# Patient Record
Sex: FEMALE | URBAN - METROPOLITAN AREA
[De-identification: names, ages, dates, MRNs, and addresses within clinical notes are randomized per-mention and may not be internally consistent; named-entity substitution may affect disease eponyms.]

---

## 2020-09-25 ENCOUNTER — EMERGENCY (EMERGENCY)
Facility: HOSPITAL | Age: 25
LOS: 0 days | Discharge: HOME | End: 2020-09-25
Attending: EMERGENCY MEDICINE
Payer: COMMERCIAL

## 2020-09-25 VITALS — HEIGHT: 62 IN | WEIGHT: 175.05 LBS

## 2020-09-25 VITALS — DIASTOLIC BLOOD PRESSURE: 79 MMHG | HEART RATE: 89 BPM | SYSTOLIC BLOOD PRESSURE: 122 MMHG

## 2020-09-25 DIAGNOSIS — Z3A.17 17 WEEKS GESTATION OF PREGNANCY: ICD-10-CM

## 2020-09-25 DIAGNOSIS — O20.9 HEMORRHAGE IN EARLY PREGNANCY, UNSPECIFIED: ICD-10-CM

## 2020-09-25 DIAGNOSIS — O99.89 OTHER SPECIFIED DISEASES AND CONDITIONS COMPLICATING PREGNANCY, CHILDBIRTH AND THE PUERPERIUM: ICD-10-CM

## 2020-09-25 DIAGNOSIS — R30.0 DYSURIA: ICD-10-CM

## 2020-09-25 DIAGNOSIS — O44.32 PARTIAL PLACENTA PREVIA WITH HEMORRHAGE, SECOND TRIMESTER: ICD-10-CM

## 2020-09-25 LAB
ABO RH CONFIRMATION: SIGNIFICANT CHANGE UP
ALBUMIN SERPL ELPH-MCNC: 3.4 G/DL — SIGNIFICANT CHANGE UP (ref 3.3–5)
ALP SERPL-CCNC: 67 U/L — SIGNIFICANT CHANGE UP (ref 40–120)
ALT FLD-CCNC: 41 U/L — SIGNIFICANT CHANGE UP (ref 12–78)
ANION GAP SERPL CALC-SCNC: 12 MMOL/L — SIGNIFICANT CHANGE UP (ref 5–17)
APPEARANCE UR: ABNORMAL
APTT BLD: 29.9 SEC — SIGNIFICANT CHANGE UP (ref 27.5–35.5)
AST SERPL-CCNC: 18 U/L — SIGNIFICANT CHANGE UP (ref 15–37)
BACTERIA # UR AUTO: ABNORMAL
BASOPHILS # BLD AUTO: 0.06 K/UL — SIGNIFICANT CHANGE UP (ref 0–0.2)
BASOPHILS NFR BLD AUTO: 0.5 % — SIGNIFICANT CHANGE UP (ref 0–2)
BILIRUB SERPL-MCNC: 0.3 MG/DL — SIGNIFICANT CHANGE UP (ref 0.2–1.2)
BILIRUB UR-MCNC: SIGNIFICANT CHANGE UP
BLD GP AB SCN SERPL QL: SIGNIFICANT CHANGE UP
BUN SERPL-MCNC: 4 MG/DL — LOW (ref 7–23)
CALCIUM SERPL-MCNC: 9.4 MG/DL — SIGNIFICANT CHANGE UP (ref 8.5–10.1)
CHLORIDE SERPL-SCNC: 105 MMOL/L — SIGNIFICANT CHANGE UP (ref 96–108)
CO2 SERPL-SCNC: 20 MMOL/L — LOW (ref 22–31)
COLOR SPEC: ABNORMAL
CREAT SERPL-MCNC: 0.72 MG/DL — SIGNIFICANT CHANGE UP (ref 0.5–1.3)
DIFF PNL FLD: SIGNIFICANT CHANGE UP
EOSINOPHIL # BLD AUTO: 0.1 K/UL — SIGNIFICANT CHANGE UP (ref 0–0.5)
EOSINOPHIL NFR BLD AUTO: 0.9 % — SIGNIFICANT CHANGE UP (ref 0–6)
EPI CELLS # UR: ABNORMAL
GLUCOSE SERPL-MCNC: 87 MG/DL — SIGNIFICANT CHANGE UP (ref 70–99)
GLUCOSE UR QL: SIGNIFICANT CHANGE UP
HCT VFR BLD CALC: 37.1 % — SIGNIFICANT CHANGE UP (ref 34.5–45)
HGB BLD-MCNC: 12.7 G/DL — SIGNIFICANT CHANGE UP (ref 11.5–15.5)
IMM GRANULOCYTES NFR BLD AUTO: 1.3 % — SIGNIFICANT CHANGE UP (ref 0–1.5)
INR BLD: 1.07 RATIO — SIGNIFICANT CHANGE UP (ref 0.88–1.16)
KETONES UR-MCNC: SIGNIFICANT CHANGE UP
LEUKOCYTE ESTERASE UR-ACNC: SIGNIFICANT CHANGE UP
LYMPHOCYTES # BLD AUTO: 17.2 % — SIGNIFICANT CHANGE UP (ref 13–44)
LYMPHOCYTES # BLD AUTO: 2.02 K/UL — SIGNIFICANT CHANGE UP (ref 1–3.3)
MCHC RBC-ENTMCNC: 29.8 PG — SIGNIFICANT CHANGE UP (ref 27–34)
MCHC RBC-ENTMCNC: 34.2 GM/DL — SIGNIFICANT CHANGE UP (ref 32–36)
MCV RBC AUTO: 87.1 FL — SIGNIFICANT CHANGE UP (ref 80–100)
MONOCYTES # BLD AUTO: 0.57 K/UL — SIGNIFICANT CHANGE UP (ref 0–0.9)
MONOCYTES NFR BLD AUTO: 4.9 % — SIGNIFICANT CHANGE UP (ref 2–14)
NEUTROPHILS # BLD AUTO: 8.83 K/UL — HIGH (ref 1.8–7.4)
NEUTROPHILS NFR BLD AUTO: 75.2 % — SIGNIFICANT CHANGE UP (ref 43–77)
NITRITE UR-MCNC: SIGNIFICANT CHANGE UP
PH UR: SIGNIFICANT CHANGE UP (ref 5–8)
PLATELET # BLD AUTO: 201 K/UL — SIGNIFICANT CHANGE UP (ref 150–400)
POTASSIUM SERPL-MCNC: 3.3 MMOL/L — LOW (ref 3.5–5.3)
POTASSIUM SERPL-SCNC: 3.3 MMOL/L — LOW (ref 3.5–5.3)
PROT SERPL-MCNC: 7.9 GM/DL — SIGNIFICANT CHANGE UP (ref 6–8.3)
PROT UR-MCNC: SIGNIFICANT CHANGE UP
PROTHROM AB SERPL-ACNC: 12.4 SEC — SIGNIFICANT CHANGE UP (ref 10.6–13.6)
RBC # BLD: 4.26 M/UL — SIGNIFICANT CHANGE UP (ref 3.8–5.2)
RBC # FLD: 13.5 % — SIGNIFICANT CHANGE UP (ref 10.3–14.5)
RBC CASTS # UR COMP ASSIST: >50 /HPF (ref 0–4)
SODIUM SERPL-SCNC: 137 MMOL/L — SIGNIFICANT CHANGE UP (ref 135–145)
SP GR SPEC: SIGNIFICANT CHANGE UP (ref 1.01–1.02)
UROBILINOGEN FLD QL: SIGNIFICANT CHANGE UP
WBC # BLD: 11.73 K/UL — HIGH (ref 3.8–10.5)
WBC # FLD AUTO: 11.73 K/UL — HIGH (ref 3.8–10.5)
WBC UR QL: ABNORMAL

## 2020-09-25 PROCEDURE — 85025 COMPLETE CBC W/AUTO DIFF WBC: CPT

## 2020-09-25 PROCEDURE — 76805 OB US >/= 14 WKS SNGL FETUS: CPT | Mod: 26

## 2020-09-25 PROCEDURE — 85610 PROTHROMBIN TIME: CPT

## 2020-09-25 PROCEDURE — 76817 TRANSVAGINAL US OBSTETRIC: CPT

## 2020-09-25 PROCEDURE — 86900 BLOOD TYPING SEROLOGIC ABO: CPT

## 2020-09-25 PROCEDURE — 99284 EMERGENCY DEPT VISIT MOD MDM: CPT | Mod: 25

## 2020-09-25 PROCEDURE — 87086 URINE CULTURE/COLONY COUNT: CPT

## 2020-09-25 PROCEDURE — 36415 COLL VENOUS BLD VENIPUNCTURE: CPT

## 2020-09-25 PROCEDURE — 85730 THROMBOPLASTIN TIME PARTIAL: CPT

## 2020-09-25 PROCEDURE — 86901 BLOOD TYPING SEROLOGIC RH(D): CPT

## 2020-09-25 PROCEDURE — 99283 EMERGENCY DEPT VISIT LOW MDM: CPT

## 2020-09-25 PROCEDURE — 81001 URINALYSIS AUTO W/SCOPE: CPT

## 2020-09-25 PROCEDURE — 76817 TRANSVAGINAL US OBSTETRIC: CPT | Mod: 26

## 2020-09-25 PROCEDURE — 99285 EMERGENCY DEPT VISIT HI MDM: CPT

## 2020-09-25 PROCEDURE — 86850 RBC ANTIBODY SCREEN: CPT

## 2020-09-25 PROCEDURE — 80053 COMPREHEN METABOLIC PANEL: CPT

## 2020-09-25 PROCEDURE — 76805 OB US >/= 14 WKS SNGL FETUS: CPT

## 2020-09-25 RX ORDER — CEPHALEXIN 500 MG
1 CAPSULE ORAL
Qty: 14 | Refills: 0
Start: 2020-09-25 | End: 2020-10-01

## 2020-09-25 NOTE — ED PROVIDER NOTE - CLINICAL SUMMARY MEDICAL DECISION MAKING FREE TEXT BOX
Pt with FHR of 141 on bedside sono with approx 17 week pregnancy verified with good fetal movement.  Discussed with OB/gyn who recommends TVUS.  TVUS shows placenta previa, which is likely source of bleeding.  No symptoms of anemia.  H/H stable.  No pain.  Denies dysuria.  Leuks on ua likely contaminant and OB recommends against treatment at this time.  As per OB, patient stable for outpatient management.  I discussed strict return precautions with patient and patient understands diagnosis and indications to return.  D/c home with strict return precautions and prompt outpatient f/u.

## 2020-09-25 NOTE — ED ADULT NURSE REASSESSMENT NOTE - NS ED NURSE REASSESS COMMENT FT1
received pt alert and oriented x 4, no acute respiratory distress, respirations even and unlabored, spouse at bedside, ambulating with steady gait, voiding without difficulty, c/o vaginal bleeding, patient transported [place] [facility] [place]

## 2020-09-25 NOTE — ED PROVIDER NOTE - NSFOLLOWUPINSTRUCTIONS_ED_ALL_ED_FT
Placenta Previa    WHAT YOU NEED TO KNOW:    What is placenta previa? Placenta previa is a condition in which your placenta grows near or over your cervix (opening of your uterus). The placenta forms during pregnancy and provides oxygen and nutrition to your unborn baby. The placenta also removes waste products from the fetus. Normally, your placenta grows in the upper part of your uterus. When your placenta grows near your cervix, it may block the opening to your vagina. You may have vaginal bleeding that could harm you and your unborn baby.     What increases my risk for placenta previa? The exact cause of placenta previa is not clear. Your risk increases as you get older. Your risk also increases if you are of  descent. The following may also increase your risk:  •Previous placenta previa: Your risk for placenta previa increases if you have had it during previous pregnancies.      •Infertility treatments: Treatments used to increase your ability to get pregnant may make you more likely to have placenta previa.      •Pregnancy: The more times you have been pregnant, the higher your risk for this condition. Your risk increases if your pregnancies are less than 1 year apart. Being pregnant with more than 1 baby, such as twins, also increases your risk. Past pregnancies that resulted in an  or miscarriage may also increase your risk for placenta previa.      •Past : A  can cause changes in your uterine tissue that increase your risk for placenta previa.      •Smoking and street drug use: Smoking before and during pregnancy increases your risk for placenta previa. Use of street drugs such as cocaine also may lead to placenta previa.      •Uterine scarring: Placenta previa is more likely to occur if you have scarring in your uterus. Scarring may occur from a past surgery or pregnancy. Scarring may increase the risk that your placenta will grow in the lower part of your uterus.      What are the signs and symptoms of placenta previa?   •Vaginal bleeding: Bleeding usually occurs in the late second or early third trimester of pregnancy, but can occur at any time. You may have small or large amounts of bleeding that normally does not cause pain. You may have bleeding after sex. Bleeding may also occur suddenly.      •Contractions: Contractions may cause abdominal pain or cramping.      How is placenta previa diagnosed? Placenta previa is often found during a normal pregnancy visit with your healthcare provider. The later in your pregnancy it is found, the higher the risk that it will not go away. Your healthcare provider will ask about your health and any previous pregnancies. Tell him if you have had past uterine surgeries or procedures. Your healthcare provider may check your cervix by gently putting a speculum into your vagina. A speculum is a tool that opens your vagina to help your healthcare provider see your cervix. You may also need any of the following:  •Ultrasound: ?Transvaginal ultrasound: A small tube is placed inside your vagina so your healthcare provider can see your uterus. It may show if your placenta lies in the lower part of your uterus. It may also show how close your placenta is to the edge or top of your cervix.      ?Abdominal ultrasound: A small device will be moved around your abdomen to show pictures of your uterus. Your healthcare provider may want you to have a full bladder for this test. It may show if your placenta is blocking the opening of your uterus. It may also show problems with your baby, such as slow growth.      ?Doppler ultrasound: A Doppler ultrasound may be done to check if your placenta has grown into the wall of your uterus.      •MRI: This scan uses powerful magnets and a computer to take pictures of your pelvis. MRI pictures may show where your placenta is in your uterus. An MRI may also show if and how deep your placenta has grown into your uterine wall. You may be given dye to help the placenta show up better in the pictures. Tell the healthcare provider if you have ever had an allergic reaction to contrast dye. Do not enter the MRI room with anything made of metal. Metal can cause serious injury. Tell the healthcare provider if you have any metal in or on your body.       How is placenta previa treated? Placenta previa may go away later in your pregnancy, and you may not need treatment. Your placenta may move when your uterus changes shape as you get closer to delivery. If you need treatment, it may depend on how far along you are in your pregnancy. Your treatment also depends on how much of your cervix is covered by your placenta. You may need any of the following:  •Bed rest: You may need to be on bedrest until your baby is ready to be born. You may be able to rest at home, or you may need to stay in the hospital. If you are in the hospital, your healthcare provider may keep you on a monitor. A monitor is used to watch your vital signs (heart rate, breathing, and blood pressure). You may also need to wear a fetal monitor. Ask your healthcare provider which activities you may do while you are on bedrest.      •Blood transfusion: You may need a blood transfusion if you lose a large amount of blood. During a blood transfusion, you will get donated blood through an IV. You may need a transfusion while you are still pregnant or after your baby is born.      •Medicines: ?Steroids: Steroids may be given if you need to deliver your baby earlier than expected. These medicines help your baby's lungs to mature and prevent breathing problems after he is born.      ?Rh immune globulin shot: You may be given an Rh immune globulin injection (RhIG) before your baby is born. RhIG is needed if you and your baby have different Rh blood types (incompatibility). Rh incompatibility means that your baby has a protein in his blood that you do not. Your body may make antibodies against your baby's blood and destroy his blood cells. RhIG injections prevent this from happening.       ? Tocolytics: Tocolytics are given to stop contractions if your baby is not ready to be born. Contractions are when the muscles of your uterus tighten and loosen.       •Amniocentesis: An amniocentesis test may be done between weeks 36 and 37 of your pregnancy if you are not bleeding. This test is done to check your unborn baby's lungs if you have a  date planned. Ask your healthcare provider for more information about amniocentesis.      •Delivery of your baby: Early delivery of your baby may be needed. If your due date is close and your placenta does not cover your cervix, you may be able to give birth vaginally. If your placenta covers most or all of your cervix, a  will be done. A  may also be done if you have heavy bleeding or you or your baby is in danger.      What are the risks of placenta previa?   •Medicines used to treat placenta previa may cause you to have a fast heartbeat. You may be at risk for blood clots. Your placenta may pull away from your uterus when you have contractions. You may have sudden, large amounts of bleeding. You may need a blood transfusion to replace the blood you have lost. Large amounts of blood loss may be life-threatening. Placenta previa increases your risk of placenta accreta. Placenta accreta is when your placenta attaches deep into the wall of your uterus. The deep attachment makes it hard for your placenta to come out after your baby is born. Placenta accreta increases your risk for bleeding even further.      •Your baby may not grow as he should inside your uterus. He may be forced into an abnormal birthing position. Your unborn baby may also have problems with his heartbeat. Your baby may be born too early and his lungs may not be fully developed. He may also be very small. This could be life-threatening for your baby. If you need surgery to deliver your baby, you may get an infection or bleed more than expected. After you give birth, you may need surgery to remove your uterus if you have bleeding that cannot be stopped. You may get a blood clot in your leg or arm. The clot may travel to your heart or brain and cause life-threatening problems, such as a heart attack or stroke.       How can I manage placenta previa?   •Have a safety plan and someone to take you to the hospital if needed.      •Stay within a short distance to the hospital so you can get there quickly.      •Do not douche or have sex. These may cause bleeding.      When should I contact my healthcare provider?   •You feel abdominal cramps, pressure, or tightening.      •Your heart is beating faster than normal for you.      •You have questions or concerns about your condition or care.      When should I seek immediate care or call 911?   •You have any amount of bleeding from your vagina.      •You are having severe abdominal pain or contractions.      •You feel faint or too weak to stand up.      •You suddenly feel lightheaded and short of breath.      •You have chest pain when you take a deep breath or cough.       •You cough up blood.      CARE AGREEMENT:    You have the right to help plan your care. Learn about your health condition and how it may be treated. Discuss treatment options with your healthcare providers to decide what care you want to receive. You always have the right to refuse treatment.

## 2020-09-25 NOTE — ED PROVIDER NOTE - OBJECTIVE STATEMENT
25 y  presenting with vaginal bleeding starting this morning.  Denies lightheadedness, sob/cp.  Denies f/c/n/v/d/dysuria.  No vaginal discharge.  Is 17 weeks pregnant today per dating US yesterday.  Discussed with on call ob who recommended she come to ED for eval.

## 2020-09-25 NOTE — ED PROVIDER NOTE - CARE PLAN
Principal Discharge DX:	Placenta previa in second trimester  Secondary Diagnosis:	Dysuria during pregnancy in second trimester

## 2020-09-25 NOTE — CONSULT NOTE ADULT - SUBJECTIVE AND OBJECTIVE BOX
KRISHAN RING  25y  Female [id number]    Patient is a 25y old Female  who presents with a chief complaint of vaginal bleeding. pt states this Am between 0600 and 0630 she went to the bathroom, had unknown amount of clear fluid release from her vaginal followed by bright red blood. Of note, pt had a LEEP performed in  for abnormal PAP, all other PAPs after this have been within normal limits as per patient. Pt had her last routine visit with her maternal fetal medicine physician Dr. Boyd, transvaginal US showed viable 17 week pregnancy within normal limits, pt also had an US of her cervix which showed no abnormality as per patient. LMP May 23-, periods last 5 days, denies menorrhagia.     HPI:      REVIEW OF SYSTEMS:  CONSTITUTIONAL: No weakness, fevers or chills  EYES/ENT: No visual changes;  No vertigo or throat pain   NECK: No pain or stiffness  RESPIRATORY: No cough, wheezing, hemoptysis; No shortness of breath  CARDIOVASCULAR: No chest pain or palpitations  GASTROINTESTINAL: No abdominal or epigastric pain. No nausea, vomiting, or hematemesis; No diarrhea or constipation. No melena or hematochezia.  GENITOURINARY: No dysuria, frequency or hematuria  NEUROLOGICAL: No numbness or weakness  SKIN: No itching, burning, rashes, or lesions   All other review of systems is negative unless indicated above    MEDICATIONS  (STANDING):    MEDICATIONS  (PRN):      Allergies    No Known Allergies    Intolerances        PAST MEDICAL & SURGICAL HISTORY:      OB/GYN HISTORY:   Menarchy           Cycle Length              Days Flow                                       Last Menstrual Period                                         G    P                                       Last Pap smear:   [id number]                                            FAMILY HISTORY:      SOCIAL HISTORY:    Vital Signs Last 24 Hrs  T(C): 37.4 (25 Sep 2020 07:27), Max: 37.4 (25 Sep 2020 07:27)  T(F): 99.3 (25 Sep 2020 07:27), Max: 99.3 (25 Sep 2020 07:27)  HR: 100 (25 Sep 2020 07:27) (100 - 100)  BP: 131/73 (25 Sep 2020 07:27) (131/73 - 131/73)  BP(mean): 91 (25 Sep 2020 07:27) (91 - 91)  RR: 17 (25 Sep 2020 07:27) (17 - 17)  SpO2: 100% (25 Sep 2020 07:27) (100% - 100%)    Last Menstrual Period      PHYSICAL EXAM:  Constitutional: NAD, awake and alert, well-developed  HEENT: PERR, EOMI, Normal Hearing, MMM  Neck: Soft and supple, No LAD, No JVD  Respiratory: Breath sounds are clear bilaterally, No wheezing, rales or rhonchi  Cardiovascular: S1 and S2, regular rate and rhythm, no Murmurs, gallops or rubs  Gastrointestinal: Bowel Sounds present, soft, nontender, nondistended, no guarding, no rebound  Extremities: No peripheral edema  Vascular: 2+ peripheral pulses  Neurological: A/O x 3, no focal deficits  Musculoskeletal: 5/5 strength b/l upper and lower extremities  Skin: No rashes    LABS:  Pregnancy Test:                        12.7   11.73 )-----------( 201      ( 25 Sep 2020 07:01 )             37.1         137  |  105  |  4<L>  ----------------------------<  87  3.3<L>   |  20<L>  |  0.72    Ca    9.4      25 Sep 2020 07:01    TPro  7.9  /  Alb  3.4  /  TBili  0.3  /  DBili  x   /  AST  18  /  ALT  41  /  AlkPhos  67      I&O's Detail          RADIOLOGY & ADDITIONAL STUDIES: KRISHAN RING  25y  Female [id number]      HPI:   Patient is a 25y old Female  who presents with a chief complaint of vaginal bleeding. Pt states this AM between 0600 and 0630 she went to the bathroom, had unknown amount of clear fluid release from her vaginal followed by bright red blood. Of note, pt had a LEEP performed in  for abnormal PAP, all other PAPs after this have been within normal limits as per patient. Pt had her last routine visit with her maternal fetal medicine physician Dr. Boyd, transvaginal US showed viable 17 week pregnancy within normal limits, pt also had an US of her cervix which showed no abnormality as per patient. LMP May 23-, periods last 5 days, denies menorrhagia. Menarche was at age11. Pt states she had the sensation of incomplete bladder emptying and difficulty starting urination at the beginning of her pregnancy, but has resolved for over one week, today denies dysuria or urinary frequency today. Pt denies fever, chills, sick contacts, HA, blurred vision, constipation, diarrhea, dysuria.     REVIEW OF SYSTEMS:  CONSTITUTIONAL: No weakness, fevers or chills  RESPIRATORY: No cough, No shortness of breath  CARDIOVASCULAR: No chest pain or palpitations  GASTROINTESTINAL: right lower quadrant pressure, No nausea, vomiting; No diarrhea or constipation.   GYN: no discharge, admits to bright red vaginal bleeding    GENITOURINARY: No dysuria, frequency   NEUROLOGICAL: no HA, no blurred vision, no numbness or weakness    MEDICATIONS  (STANDING):      MEDICATIONS  (PRN):    Allergies    No Known Allergies    Intolerances        PAST MEDICAL & SURGICAL HISTORY:      OB/GYN HISTORY:   Menarchy    12       Cycle Length             Days Flow 5 days                                        Last Menstrual Period                                         G 1                                           Last Pap smear:  2020 within normal limits as per patient [id number]                                            FAMILY HISTORY: breast cancer and lung cancer: paternal grandmother, heart failure: grandfather       SOCIAL HISTORY: pt denies ever smoking, social etoh use prior to pregnancy, no other illicit drug use     Vital Signs Last 24 Hrs  T(C): 37.4 (25 Sep 2020 07:27), Max: 37.4 (25 Sep 2020 07:27)  T(F): 99.3 (25 Sep 2020 07:27), Max: 99.3 (25 Sep 2020 07:27)  HR: 100 (25 Sep 2020 07:27) (100 - 100)  BP: 131/73 (25 Sep 2020 07:27) (131/73 - 131/73)  BP(mean): 91 (25 Sep 2020 07:27) (91 - 91)  RR: 17 (25 Sep 2020 07:27) (17 - 17)  SpO2: 100% (25 Sep 2020 07:27) (100% - 100%)    Last Menstrual Period      PHYSICAL EXAM:  Constitutional: NAD, awake and alert, well-developed  Respiratory: Breath sounds are clear bilaterally, rales or rhonchi  Cardiovascular: S1 and S2, regular rate and rhythm, no Murmurs, gallops or rubs  Gastrointestinal: RLQ minimal tenderness to palpation, Bowel Sounds present, soft, nontender, nondistended, no guarding, no rebound  GYN: right adnexal minimal tenderness to palpation   Neurological: A/O x 3, moving all four extremities grossly      LABS:  Pregnancy Test:                        12.7   11.73 )-----------( 201      ( 25 Sep 2020 07:01 )             37.1         137  |  105  |  4<L>  ----------------------------<  87  3.3<L>   |  20<L>  |  0.72    Ca    9.4      25 Sep 2020 07:01    TPro  7.9  /  Alb  3.4  /  TBili  0.3  /  DBili  x   /  AST  18  /  ALT  41  /  AlkPhos  67      I&O's Detail          RADIOLOGY & ADDITIONAL STUDIES: KRISHAN RING  25y  Female [id number]      HPI:   Patient is a 25y old Female  who presents with a chief complaint of vaginal bleeding. Pt states this AM between 0600 and 0630 she went to the bathroom, had unknown amount of clear fluid release from her vaginal followed by bright red blood. Of note, pt had a LEEP performed in  for abnormal PAP, all other PAPs after this have been within normal limits as per patient. Pt had her last routine visit with her maternal fetal medicine physician Dr. Boyd, transvaginal US showed viable 17 week pregnancy within normal limits, pt also had an US of her cervix which showed no abnormality as per patient. LMP May 23-, periods last 5 days, denies menorrhagia. Menarche was at age 12. Pt states she had the sensation of incomplete bladder emptying and difficulty starting urination at the beginning of her pregnancy, but has resolved for over one week, today denies dysuria or urinary frequency today. Pt denies fever, chills, sick contacts, HA, blurred vision, constipation, diarrhea, dysuria.     REVIEW OF SYSTEMS:  CONSTITUTIONAL: No weakness, fevers or chills  RESPIRATORY: No cough, No shortness of breath  CARDIOVASCULAR: No chest pain or palpitations  GASTROINTESTINAL: right lower quadrant pressure, No nausea, vomiting; No diarrhea or constipation.   GYN: no discharge, admits to bright red vaginal bleeding    GENITOURINARY: No dysuria, frequency   NEUROLOGICAL: no HA, no blurred vision, no numbness or weakness    MEDICATIONS  (STANDING):      MEDICATIONS  (PRN):    Allergies    No Known Allergies    Intolerances        PAST MEDICAL & SURGICAL HISTORY:      OB/GYN HISTORY:   Menarchy    12       Cycle Length             Days Flow 5 days                                        Last Menstrual Period                                         G 1                                           Last Pap smear:  2020 within normal limits as per patient [id number]                                            FAMILY HISTORY: breast cancer and lung cancer: paternal grandmother, heart failure: grandfather       SOCIAL HISTORY: pt denies ever smoking, social etoh use prior to pregnancy, no other illicit drug use     Vital Signs Last 24 Hrs  T(C): 37.4 (25 Sep 2020 07:27), Max: 37.4 (25 Sep 2020 07:27)  T(F): 99.3 (25 Sep 2020 07:27), Max: 99.3 (25 Sep 2020 07:27)  HR: 100 (25 Sep 2020 07:27) (100 - 100)  BP: 131/73 (25 Sep 2020 07:27) (131/73 - 131/73)  BP(mean): 91 (25 Sep 2020 07:27) (91 - 91)  RR: 17 (25 Sep 2020 07:27) (17 - 17)  SpO2: 100% (25 Sep 2020 07:27) (100% - 100%)    Last Menstrual Period      PHYSICAL EXAM:  Constitutional: NAD, awake and alert, well-developed  Respiratory: Breath sounds are clear bilaterally, rales or rhonchi  Cardiovascular: S1 and S2, regular rate and rhythm, no Murmurs, gallops or rubs  Gastrointestinal: RLQ minimal tenderness to palpation, Bowel Sounds present, soft, nontender, nondistended, no guarding, no rebound  GYN: right adnexal minimal tenderness to palpation   Neurological: A/O x 3, moving all four extremities grossly      LABS:  Pregnancy Test:                        12.7   11.73 )-----------( 201      ( 25 Sep 2020 07:01 )             37.1         137  |  105  |  4<L>  ----------------------------<  87  3.3<L>   |  20<L>  |  0.72    Ca    9.4      25 Sep 2020 07:01    TPro  7.9  /  Alb  3.4  /  TBili  0.3  /  DBili  x   /  AST  18  /  ALT  41  /  AlkPhos  67      I&O's Detail          RADIOLOGY & ADDITIONAL STUDIES:

## 2020-09-25 NOTE — ED ADULT TRIAGE NOTE - PAIN RATING/NUMBER SCALE (0-10): ACTIVITY
5/9/2017 1:15 PM 
 
Ms. Susan Paez [address] Dear Susan Paez: 
 
Please find your most recent results below. Resulted Orders DUPLEX LOWER EXT VENOUS RIGHT Narrative INDICATION:  Asymmetric right leg swelling EXAMINATION:  US DOPPLER VENOUS EXTREMITY, right COMPARISON: None FINDINGS: 
 
Duplex venous Doppler examination was performed from the right inguinal 
ligaments to the mid-calf regions. Spectral analysis and color wave-form 
imaging were performed. There is no deep venous thrombosis. Impression IMPRESSION: 
 
No evidence for right lower extremity DVT. Please call me if you have any questions: [phone] Sincerely, 
 
MD CARMELA  

0

## 2020-09-25 NOTE — ED ADULT NURSE NOTE - CHIEF COMPLAINT QUOTE
Pt presents to ED with  c/o vaginal bleeding that started 25 minutes PTA. Pt states she woke up and noticed she was bleeding, pt reports bright red blood. Denies passing any clots. pt called on call OB and was instructed to go to ER. As per pt, her OB is meeting her at the hospital. Pt's OB is [facility]. Pt denies pain, no chest pain, no SOB.  pt is 17 weeks pregnant.

## 2020-09-25 NOTE — ED ADULT TRIAGE NOTE - CHIEF COMPLAINT QUOTE
Pt presents to ED with  c/o vaginal bleeding that started 25 minutes PTA. Pt states she woke up and noticed she was bleeding, pt reports bright red blood. Denies passing any clots. pt called on call OB and was instructed to go to ER. As per pt, her OB is meeting her at the hospital. Pt's OB is [facility]. Pt denies pain, no chest pain, no SOB.  Pt presents to ED with  c/o vaginal bleeding that started 25 minutes PTA. Pt states she woke up and noticed she was bleeding, pt reports bright red blood. Denies passing any clots. pt called on call OB and was instructed to go to ER. As per pt, her OB is meeting her at the hospital. Pt's OB is [facility]. Pt denies pain, no chest pain, no SOB.  pt is 17 weeks pregnant.

## 2020-09-25 NOTE — ED PROVIDER NOTE - PATIENT PORTAL LINK FT
You can access the FollowMyHealth Patient Portal offered by [facility] by registering at the following website: [email or web address]. By joining [employer]’s FollowMyHealth portal, you will also be able to view your health information using other applications (apps) compatible with our system. Never smoker

## 2020-09-25 NOTE — ED ADULT NURSE NOTE - OBJECTIVE STATEMENT
Pt c/o vaginal bleeding.  17 weeks pregnant.  Pt reports she woke up around 6 am and noticed vaginal bleeding. Pt reports bright red blood, denies passing clots.  Pt reports RLQ intermittent pain. Denies N/V/D/ fever, chills. Pt denies CP, SOB. Bedside US performed by MD Tomlin at 0705. Pt  at the bedside.

## 2020-09-25 NOTE — ED PROVIDER NOTE - PHYSICAL EXAMINATION
Constitutional: NAD, well appearing  HEENT: no rhinorrhea, PERRL, no oropharyngeal erythema or exudates, midline uvula.  TMs clear.  CVS:  RRR, no m/r/g  Resp:  CTAB  GI: soft, ntnd  MSK:  no restriction to rom, full ROM to all extremities  Neuro:  A&Ox3  Skin: no rash  psych: clear thought content  Heme/lymph:  No LAD

## 2020-09-26 LAB
CULTURE RESULTS: SIGNIFICANT CHANGE UP
SPECIMEN SOURCE: SIGNIFICANT CHANGE UP

## 2020-12-02 ENCOUNTER — OUTPATIENT (OUTPATIENT)
Dept: INPATIENT UNIT | Facility: HOSPITAL | Age: 25
LOS: 1 days | Discharge: HOME | End: 2020-12-02
Payer: COMMERCIAL

## 2020-12-02 DIAGNOSIS — Z3A.00 WEEKS OF GESTATION OF PREGNANCY NOT SPECIFIED: ICD-10-CM

## 2020-12-02 LAB — AMNISURE ROM (RUPTURE OF MEMBRANES): NEGATIVE — SIGNIFICANT CHANGE UP

## 2020-12-02 PROCEDURE — G0463: CPT

## 2020-12-02 PROCEDURE — 99214 OFFICE O/P EST MOD 30 MIN: CPT

## 2020-12-02 PROCEDURE — 84112 EVAL AMNIOTIC FLUID PROTEIN: CPT

## 2020-12-04 DIAGNOSIS — O47.9 FALSE LABOR, UNSPECIFIED: ICD-10-CM

## 2020-12-31 ENCOUNTER — TRANSCRIPTION ENCOUNTER (OUTPATIENT)
Age: 25
End: 2020-12-31

## 2021-02-05 ENCOUNTER — OUTPATIENT (OUTPATIENT)
Dept: INPATIENT UNIT | Facility: HOSPITAL | Age: 26
LOS: 1 days | Discharge: HOME | End: 2021-02-05
Payer: COMMERCIAL

## 2021-02-05 DIAGNOSIS — Z3A.00 WEEKS OF GESTATION OF PREGNANCY NOT SPECIFIED: ICD-10-CM

## 2021-02-05 LAB
ALBUMIN SERPL ELPH-MCNC: 2.7 G/DL — LOW (ref 3.3–5)
ALP SERPL-CCNC: 186 U/L — HIGH (ref 40–120)
ALT FLD-CCNC: 18 U/L — SIGNIFICANT CHANGE UP (ref 12–78)
ANION GAP SERPL CALC-SCNC: 8 MMOL/L — SIGNIFICANT CHANGE UP (ref 5–17)
APPEARANCE UR: ABNORMAL
APTT BLD: 27.6 SEC — SIGNIFICANT CHANGE UP (ref 27.5–35.5)
AST SERPL-CCNC: 15 U/L — SIGNIFICANT CHANGE UP (ref 15–37)
BASOPHILS # BLD AUTO: 0.05 K/UL — SIGNIFICANT CHANGE UP (ref 0–0.2)
BASOPHILS NFR BLD AUTO: 0.4 % — SIGNIFICANT CHANGE UP (ref 0–2)
BILIRUB SERPL-MCNC: 0.2 MG/DL — SIGNIFICANT CHANGE UP (ref 0.2–1.2)
BILIRUB UR-MCNC: NEGATIVE — SIGNIFICANT CHANGE UP
BUN SERPL-MCNC: 5 MG/DL — LOW (ref 7–23)
CALCIUM SERPL-MCNC: 8.9 MG/DL — SIGNIFICANT CHANGE UP (ref 8.5–10.1)
CHLORIDE SERPL-SCNC: 109 MMOL/L — HIGH (ref 96–108)
CO2 SERPL-SCNC: 21 MMOL/L — LOW (ref 22–31)
COLOR SPEC: YELLOW — SIGNIFICANT CHANGE UP
CREAT ?TM UR-MCNC: 34 MG/DL — SIGNIFICANT CHANGE UP
CREAT SERPL-MCNC: 0.59 MG/DL — SIGNIFICANT CHANGE UP (ref 0.5–1.3)
DIFF PNL FLD: ABNORMAL
EOSINOPHIL # BLD AUTO: 0.08 K/UL — SIGNIFICANT CHANGE UP (ref 0–0.5)
EOSINOPHIL NFR BLD AUTO: 0.6 % — SIGNIFICANT CHANGE UP (ref 0–6)
FIBRINOGEN PPP-MCNC: 868 MG/DL — HIGH (ref 290–520)
GLUCOSE SERPL-MCNC: 71 MG/DL — SIGNIFICANT CHANGE UP (ref 70–99)
GLUCOSE UR QL: NEGATIVE MG/DL — SIGNIFICANT CHANGE UP
HCT VFR BLD CALC: 32.5 % — LOW (ref 34.5–45)
HGB BLD-MCNC: 10.4 G/DL — LOW (ref 11.5–15.5)
IMM GRANULOCYTES NFR BLD AUTO: 1.6 % — HIGH (ref 0–1.5)
INR BLD: 0.99 RATIO — SIGNIFICANT CHANGE UP (ref 0.88–1.16)
KETONES UR-MCNC: NEGATIVE — SIGNIFICANT CHANGE UP
LDH SERPL L TO P-CCNC: 188 U/L — SIGNIFICANT CHANGE UP (ref 84–241)
LEUKOCYTE ESTERASE UR-ACNC: ABNORMAL
LYMPHOCYTES # BLD AUTO: 1.67 K/UL — SIGNIFICANT CHANGE UP (ref 1–3.3)
LYMPHOCYTES # BLD AUTO: 13 % — SIGNIFICANT CHANGE UP (ref 13–44)
MCHC RBC-ENTMCNC: 25.3 PG — LOW (ref 27–34)
MCHC RBC-ENTMCNC: 32 GM/DL — SIGNIFICANT CHANGE UP (ref 32–36)
MCV RBC AUTO: 79.1 FL — LOW (ref 80–100)
MONOCYTES # BLD AUTO: 0.91 K/UL — HIGH (ref 0–0.9)
MONOCYTES NFR BLD AUTO: 7.1 % — SIGNIFICANT CHANGE UP (ref 2–14)
NEUTROPHILS # BLD AUTO: 9.98 K/UL — HIGH (ref 1.8–7.4)
NEUTROPHILS NFR BLD AUTO: 77.3 % — HIGH (ref 43–77)
NITRITE UR-MCNC: NEGATIVE — SIGNIFICANT CHANGE UP
PH UR: 6 — SIGNIFICANT CHANGE UP (ref 5–8)
PLATELET # BLD AUTO: 206 K/UL — SIGNIFICANT CHANGE UP (ref 150–400)
POTASSIUM SERPL-MCNC: 4 MMOL/L — SIGNIFICANT CHANGE UP (ref 3.5–5.3)
POTASSIUM SERPL-SCNC: 4 MMOL/L — SIGNIFICANT CHANGE UP (ref 3.5–5.3)
PROT ?TM UR-MCNC: 10 MG/DL — SIGNIFICANT CHANGE UP (ref 0–12)
PROT SERPL-MCNC: 7.1 GM/DL — SIGNIFICANT CHANGE UP (ref 6–8.3)
PROT UR-MCNC: NEGATIVE MG/DL — SIGNIFICANT CHANGE UP
PROT/CREAT UR-RTO: 0.3 RATIO — HIGH (ref 0–0.2)
PROTHROM AB SERPL-ACNC: 11.5 SEC — SIGNIFICANT CHANGE UP (ref 10.6–13.6)
RBC # BLD: 4.11 M/UL — SIGNIFICANT CHANGE UP (ref 3.8–5.2)
RBC # FLD: 14.8 % — HIGH (ref 10.3–14.5)
SODIUM SERPL-SCNC: 138 MMOL/L — SIGNIFICANT CHANGE UP (ref 135–145)
SP GR SPEC: 1.01 — SIGNIFICANT CHANGE UP (ref 1.01–1.02)
URATE SERPL-MCNC: 5.2 MG/DL — SIGNIFICANT CHANGE UP (ref 2.5–7)
UROBILINOGEN FLD QL: NEGATIVE MG/DL — SIGNIFICANT CHANGE UP
WBC # BLD: 12.89 K/UL — HIGH (ref 3.8–10.5)
WBC # FLD AUTO: 12.89 K/UL — HIGH (ref 3.8–10.5)

## 2021-02-05 PROCEDURE — 83615 LACTATE (LD) (LDH) ENZYME: CPT

## 2021-02-05 PROCEDURE — 84156 ASSAY OF PROTEIN URINE: CPT

## 2021-02-05 PROCEDURE — 85384 FIBRINOGEN ACTIVITY: CPT

## 2021-02-05 PROCEDURE — G0463: CPT

## 2021-02-05 PROCEDURE — 36415 COLL VENOUS BLD VENIPUNCTURE: CPT

## 2021-02-05 PROCEDURE — 85610 PROTHROMBIN TIME: CPT

## 2021-02-05 PROCEDURE — 85730 THROMBOPLASTIN TIME PARTIAL: CPT

## 2021-02-05 PROCEDURE — 85025 COMPLETE CBC W/AUTO DIFF WBC: CPT

## 2021-02-05 PROCEDURE — 80053 COMPREHEN METABOLIC PANEL: CPT

## 2021-02-05 PROCEDURE — 99214 OFFICE O/P EST MOD 30 MIN: CPT

## 2021-02-05 PROCEDURE — 81001 URINALYSIS AUTO W/SCOPE: CPT

## 2021-02-05 PROCEDURE — 82570 ASSAY OF URINE CREATININE: CPT

## 2021-02-05 PROCEDURE — 84550 ASSAY OF BLOOD/URIC ACID: CPT

## 2021-02-05 PROCEDURE — 59025 FETAL NON-STRESS TEST: CPT

## 2021-02-08 DIAGNOSIS — O47.9 FALSE LABOR, UNSPECIFIED: ICD-10-CM

## 2021-02-10 DIAGNOSIS — Z01.818 ENCOUNTER FOR OTHER PREPROCEDURAL EXAMINATION: ICD-10-CM

## 2021-02-10 PROBLEM — Z00.00 ENCOUNTER FOR PREVENTIVE HEALTH EXAMINATION: Status: ACTIVE | Noted: 2021-02-10

## 2021-02-11 ENCOUNTER — APPOINTMENT (OUTPATIENT)
Dept: DISASTER EMERGENCY | Facility: CLINIC | Age: 26
End: 2021-02-11

## 2021-02-12 ENCOUNTER — TRANSCRIPTION ENCOUNTER (OUTPATIENT)
Age: 26
End: 2021-02-12

## 2021-02-12 LAB — SARS-COV-2 N GENE NPH QL NAA+PROBE: NOT DETECTED

## 2021-02-14 ENCOUNTER — INPATIENT (INPATIENT)
Facility: HOSPITAL | Age: 26
LOS: 2 days | Discharge: HOME | End: 2021-02-17
Attending: OBSTETRICS & GYNECOLOGY | Admitting: OBSTETRICS & GYNECOLOGY
Payer: COMMERCIAL

## 2021-02-14 VITALS — WEIGHT: 198.42 LBS | HEIGHT: 62 IN

## 2021-02-14 DIAGNOSIS — O26.899 OTHER SPECIFIED PREGNANCY RELATED CONDITIONS, UNSPECIFIED TRIMESTER: ICD-10-CM

## 2021-02-14 LAB
ALBUMIN SERPL ELPH-MCNC: 2.7 G/DL — LOW (ref 3.3–5)
ALP SERPL-CCNC: 191 U/L — HIGH (ref 40–120)
ALT FLD-CCNC: 17 U/L — SIGNIFICANT CHANGE UP (ref 12–78)
ANION GAP SERPL CALC-SCNC: 10 MMOL/L — SIGNIFICANT CHANGE UP (ref 5–17)
APPEARANCE UR: CLEAR — SIGNIFICANT CHANGE UP
APTT BLD: 27.1 SEC — LOW (ref 27.5–35.5)
AST SERPL-CCNC: 15 U/L — SIGNIFICANT CHANGE UP (ref 15–37)
BASOPHILS # BLD AUTO: 0.03 K/UL — SIGNIFICANT CHANGE UP (ref 0–0.2)
BASOPHILS NFR BLD AUTO: 0.3 % — SIGNIFICANT CHANGE UP (ref 0–2)
BILIRUB SERPL-MCNC: 0.2 MG/DL — SIGNIFICANT CHANGE UP (ref 0.2–1.2)
BILIRUB UR-MCNC: NEGATIVE — SIGNIFICANT CHANGE UP
BUN SERPL-MCNC: 9 MG/DL — SIGNIFICANT CHANGE UP (ref 7–23)
CALCIUM SERPL-MCNC: 9.1 MG/DL — SIGNIFICANT CHANGE UP (ref 8.5–10.1)
CHLORIDE SERPL-SCNC: 109 MMOL/L — HIGH (ref 96–108)
CO2 SERPL-SCNC: 20 MMOL/L — LOW (ref 22–31)
COLOR SPEC: YELLOW — SIGNIFICANT CHANGE UP
CREAT SERPL-MCNC: 0.69 MG/DL — SIGNIFICANT CHANGE UP (ref 0.5–1.3)
DIFF PNL FLD: ABNORMAL
EOSINOPHIL # BLD AUTO: 0.08 K/UL — SIGNIFICANT CHANGE UP (ref 0–0.5)
EOSINOPHIL NFR BLD AUTO: 0.7 % — SIGNIFICANT CHANGE UP (ref 0–6)
FIBRINOGEN PPP-MCNC: 778 MG/DL — HIGH (ref 290–520)
GLUCOSE SERPL-MCNC: 98 MG/DL — SIGNIFICANT CHANGE UP (ref 70–99)
GLUCOSE UR QL: NEGATIVE MG/DL — SIGNIFICANT CHANGE UP
HCT VFR BLD CALC: 30.9 % — LOW (ref 34.5–45)
HGB BLD-MCNC: 9.9 G/DL — LOW (ref 11.5–15.5)
IMM GRANULOCYTES NFR BLD AUTO: 1.7 % — HIGH (ref 0–1.5)
INR BLD: 0.97 RATIO — SIGNIFICANT CHANGE UP (ref 0.88–1.16)
KETONES UR-MCNC: ABNORMAL
LDH SERPL L TO P-CCNC: 176 U/L — SIGNIFICANT CHANGE UP (ref 84–241)
LEUKOCYTE ESTERASE UR-ACNC: ABNORMAL
LYMPHOCYTES # BLD AUTO: 1.38 K/UL — SIGNIFICANT CHANGE UP (ref 1–3.3)
LYMPHOCYTES # BLD AUTO: 12.8 % — LOW (ref 13–44)
MCHC RBC-ENTMCNC: 24.9 PG — LOW (ref 27–34)
MCHC RBC-ENTMCNC: 32 GM/DL — SIGNIFICANT CHANGE UP (ref 32–36)
MCV RBC AUTO: 77.8 FL — LOW (ref 80–100)
MONOCYTES # BLD AUTO: 0.76 K/UL — SIGNIFICANT CHANGE UP (ref 0–0.9)
MONOCYTES NFR BLD AUTO: 7 % — SIGNIFICANT CHANGE UP (ref 2–14)
NEUTROPHILS # BLD AUTO: 8.38 K/UL — HIGH (ref 1.8–7.4)
NEUTROPHILS NFR BLD AUTO: 77.5 % — HIGH (ref 43–77)
NITRITE UR-MCNC: NEGATIVE — SIGNIFICANT CHANGE UP
PH UR: 6 — SIGNIFICANT CHANGE UP (ref 5–8)
PLATELET # BLD AUTO: 203 K/UL — SIGNIFICANT CHANGE UP (ref 150–400)
POTASSIUM SERPL-MCNC: 3.7 MMOL/L — SIGNIFICANT CHANGE UP (ref 3.5–5.3)
POTASSIUM SERPL-SCNC: 3.7 MMOL/L — SIGNIFICANT CHANGE UP (ref 3.5–5.3)
PROT SERPL-MCNC: 6.8 GM/DL — SIGNIFICANT CHANGE UP (ref 6–8.3)
PROT UR-MCNC: 30 MG/DL
PROTHROM AB SERPL-ACNC: 11.3 SEC — SIGNIFICANT CHANGE UP (ref 10.6–13.6)
RBC # BLD: 3.97 M/UL — SIGNIFICANT CHANGE UP (ref 3.8–5.2)
RBC # FLD: 15.1 % — HIGH (ref 10.3–14.5)
SARS-COV-2 IGG SERPL QL IA: NEGATIVE — SIGNIFICANT CHANGE UP
SARS-COV-2 IGM SERPL IA-ACNC: 0.07 INDEX — SIGNIFICANT CHANGE UP
SODIUM SERPL-SCNC: 139 MMOL/L — SIGNIFICANT CHANGE UP (ref 135–145)
SP GR SPEC: 1.02 — SIGNIFICANT CHANGE UP (ref 1.01–1.02)
T PALLIDUM AB TITR SER: NEGATIVE — SIGNIFICANT CHANGE UP
URATE SERPL-MCNC: 5.8 MG/DL — SIGNIFICANT CHANGE UP (ref 2.5–7)
UROBILINOGEN FLD QL: NEGATIVE MG/DL — SIGNIFICANT CHANGE UP
WBC # BLD: 10.81 K/UL — HIGH (ref 3.8–10.5)
WBC # FLD AUTO: 10.81 K/UL — HIGH (ref 3.8–10.5)

## 2021-02-14 PROCEDURE — 86901 BLOOD TYPING SEROLOGIC RH(D): CPT

## 2021-02-14 PROCEDURE — 86769 SARS-COV-2 COVID-19 ANTIBODY: CPT

## 2021-02-14 PROCEDURE — 85025 COMPLETE CBC W/AUTO DIFF WBC: CPT

## 2021-02-14 PROCEDURE — 81001 URINALYSIS AUTO W/SCOPE: CPT

## 2021-02-14 PROCEDURE — 84550 ASSAY OF BLOOD/URIC ACID: CPT

## 2021-02-14 PROCEDURE — G0463: CPT

## 2021-02-14 PROCEDURE — 59050 FETAL MONITOR W/REPORT: CPT

## 2021-02-14 PROCEDURE — 86900 BLOOD TYPING SEROLOGIC ABO: CPT

## 2021-02-14 PROCEDURE — 85610 PROTHROMBIN TIME: CPT

## 2021-02-14 PROCEDURE — 80053 COMPREHEN METABOLIC PANEL: CPT

## 2021-02-14 PROCEDURE — C1889: CPT

## 2021-02-14 PROCEDURE — 85027 COMPLETE CBC AUTOMATED: CPT

## 2021-02-14 PROCEDURE — 93005 ELECTROCARDIOGRAM TRACING: CPT

## 2021-02-14 PROCEDURE — 85384 FIBRINOGEN ACTIVITY: CPT

## 2021-02-14 PROCEDURE — 83615 LACTATE (LD) (LDH) ENZYME: CPT

## 2021-02-14 PROCEDURE — 86780 TREPONEMA PALLIDUM: CPT

## 2021-02-14 PROCEDURE — 85730 THROMBOPLASTIN TIME PARTIAL: CPT

## 2021-02-14 PROCEDURE — 86850 RBC ANTIBODY SCREEN: CPT

## 2021-02-14 PROCEDURE — 94760 N-INVAS EAR/PLS OXIMETRY 1: CPT

## 2021-02-14 PROCEDURE — 36415 COLL VENOUS BLD VENIPUNCTURE: CPT

## 2021-02-14 RX ORDER — BUTORPHANOL TARTRATE 2 MG/ML
2 INJECTION, SOLUTION INTRAMUSCULAR; INTRAVENOUS ONCE
Refills: 0 | Status: DISCONTINUED | OUTPATIENT
Start: 2021-02-14 | End: 2021-02-14

## 2021-02-14 RX ORDER — CITRIC ACID/SODIUM CITRATE 300-500 MG
30 SOLUTION, ORAL ORAL ONCE
Refills: 0 | Status: DISCONTINUED | OUTPATIENT
Start: 2021-02-14 | End: 2021-02-15

## 2021-02-14 RX ORDER — SODIUM CHLORIDE 9 MG/ML
1000 INJECTION, SOLUTION INTRAVENOUS
Refills: 0 | Status: DISCONTINUED | OUTPATIENT
Start: 2021-02-14 | End: 2021-02-15

## 2021-02-14 RX ORDER — OXYTOCIN 10 UNIT/ML
333.33 VIAL (ML) INJECTION
Qty: 20 | Refills: 0 | Status: DISCONTINUED | OUTPATIENT
Start: 2021-02-14 | End: 2021-02-17

## 2021-02-14 RX ADMIN — BUTORPHANOL TARTRATE 2 MILLIGRAM(S): 2 INJECTION, SOLUTION INTRAMUSCULAR; INTRAVENOUS at 21:12

## 2021-02-14 RX ADMIN — SODIUM CHLORIDE 125 MILLILITER(S): 9 INJECTION, SOLUTION INTRAVENOUS at 21:12

## 2021-02-15 LAB
HCT VFR BLD CALC: 27.3 % — LOW (ref 34.5–45)
HGB BLD-MCNC: 8.6 G/DL — LOW (ref 11.5–15.5)
MCHC RBC-ENTMCNC: 24.6 PG — LOW (ref 27–34)
MCHC RBC-ENTMCNC: 31.5 GM/DL — LOW (ref 32–36)
MCV RBC AUTO: 78.2 FL — LOW (ref 80–100)
PLATELET # BLD AUTO: 161 K/UL — SIGNIFICANT CHANGE UP (ref 150–400)
RBC # BLD: 3.49 M/UL — LOW (ref 3.8–5.2)
RBC # FLD: 15.4 % — HIGH (ref 10.3–14.5)
WBC # BLD: 14.58 K/UL — HIGH (ref 3.8–10.5)
WBC # FLD AUTO: 14.58 K/UL — HIGH (ref 3.8–10.5)

## 2021-02-15 PROCEDURE — 93010 ELECTROCARDIOGRAM REPORT: CPT

## 2021-02-15 RX ORDER — OXYCODONE HYDROCHLORIDE 5 MG/1
5 TABLET ORAL
Refills: 0 | Status: DISCONTINUED | OUTPATIENT
Start: 2021-02-15 | End: 2021-02-17

## 2021-02-15 RX ORDER — KETOROLAC TROMETHAMINE 30 MG/ML
30 SYRINGE (ML) INJECTION EVERY 6 HOURS
Refills: 0 | Status: DISCONTINUED | OUTPATIENT
Start: 2021-02-15 | End: 2021-02-16

## 2021-02-15 RX ORDER — DIBUCAINE 1 %
1 OINTMENT (GRAM) RECTAL EVERY 6 HOURS
Refills: 0 | Status: DISCONTINUED | OUTPATIENT
Start: 2021-02-15 | End: 2021-02-17

## 2021-02-15 RX ORDER — MAGNESIUM HYDROXIDE 400 MG/1
30 TABLET, CHEWABLE ORAL
Refills: 0 | Status: DISCONTINUED | OUTPATIENT
Start: 2021-02-15 | End: 2021-02-17

## 2021-02-15 RX ORDER — OXYCODONE HYDROCHLORIDE 5 MG/1
5 TABLET ORAL ONCE
Refills: 0 | Status: DISCONTINUED | OUTPATIENT
Start: 2021-02-15 | End: 2021-02-17

## 2021-02-15 RX ORDER — LANOLIN
1 OINTMENT (GRAM) TOPICAL EVERY 6 HOURS
Refills: 0 | Status: DISCONTINUED | OUTPATIENT
Start: 2021-02-15 | End: 2021-02-17

## 2021-02-15 RX ORDER — PRAMOXINE HYDROCHLORIDE 150 MG/15G
1 AEROSOL, FOAM RECTAL EVERY 4 HOURS
Refills: 0 | Status: DISCONTINUED | OUTPATIENT
Start: 2021-02-15 | End: 2021-02-17

## 2021-02-15 RX ORDER — OXYTOCIN 10 UNIT/ML
333.33 VIAL (ML) INJECTION
Qty: 20 | Refills: 0 | Status: DISCONTINUED | OUTPATIENT
Start: 2021-02-15 | End: 2021-02-17

## 2021-02-15 RX ORDER — IBUPROFEN 200 MG
600 TABLET ORAL EVERY 6 HOURS
Refills: 0 | Status: DISCONTINUED | OUTPATIENT
Start: 2021-02-15 | End: 2021-02-17

## 2021-02-15 RX ORDER — SODIUM CHLORIDE 9 MG/ML
1000 INJECTION, SOLUTION INTRAVENOUS
Refills: 0 | Status: DISCONTINUED | OUTPATIENT
Start: 2021-02-15 | End: 2021-02-17

## 2021-02-15 RX ORDER — AER TRAVELER 0.5 G/1
1 SOLUTION RECTAL; TOPICAL EVERY 4 HOURS
Refills: 0 | Status: DISCONTINUED | OUTPATIENT
Start: 2021-02-15 | End: 2021-02-17

## 2021-02-15 RX ORDER — BENZOCAINE 10 %
1 GEL (GRAM) MUCOUS MEMBRANE EVERY 6 HOURS
Refills: 0 | Status: DISCONTINUED | OUTPATIENT
Start: 2021-02-15 | End: 2021-02-17

## 2021-02-15 RX ORDER — DIPHENHYDRAMINE HCL 50 MG
25 CAPSULE ORAL EVERY 6 HOURS
Refills: 0 | Status: DISCONTINUED | OUTPATIENT
Start: 2021-02-15 | End: 2021-02-17

## 2021-02-15 RX ORDER — HYDROCORTISONE 1 %
1 OINTMENT (GRAM) TOPICAL EVERY 6 HOURS
Refills: 0 | Status: DISCONTINUED | OUTPATIENT
Start: 2021-02-15 | End: 2021-02-17

## 2021-02-15 RX ORDER — KETOROLAC TROMETHAMINE 30 MG/ML
30 SYRINGE (ML) INJECTION ONCE
Refills: 0 | Status: DISCONTINUED | OUTPATIENT
Start: 2021-02-15 | End: 2021-02-15

## 2021-02-15 RX ORDER — IBUPROFEN 200 MG
600 TABLET ORAL EVERY 6 HOURS
Refills: 0 | Status: COMPLETED | OUTPATIENT
Start: 2021-02-15 | End: 2022-01-14

## 2021-02-15 RX ORDER — SIMETHICONE 80 MG/1
80 TABLET, CHEWABLE ORAL EVERY 4 HOURS
Refills: 0 | Status: DISCONTINUED | OUTPATIENT
Start: 2021-02-15 | End: 2021-02-17

## 2021-02-15 RX ORDER — ACETAMINOPHEN 500 MG
975 TABLET ORAL
Refills: 0 | Status: DISCONTINUED | OUTPATIENT
Start: 2021-02-15 | End: 2021-02-17

## 2021-02-15 RX ORDER — TETANUS TOXOID, REDUCED DIPHTHERIA TOXOID AND ACELLULAR PERTUSSIS VACCINE, ADSORBED 5; 2.5; 8; 8; 2.5 [IU]/.5ML; [IU]/.5ML; UG/.5ML; UG/.5ML; UG/.5ML
0.5 SUSPENSION INTRAMUSCULAR ONCE
Refills: 0 | Status: DISCONTINUED | OUTPATIENT
Start: 2021-02-15 | End: 2021-02-17

## 2021-02-15 RX ORDER — SODIUM CHLORIDE 9 MG/ML
3 INJECTION INTRAMUSCULAR; INTRAVENOUS; SUBCUTANEOUS EVERY 8 HOURS
Refills: 0 | Status: DISCONTINUED | OUTPATIENT
Start: 2021-02-15 | End: 2021-02-16

## 2021-02-15 RX ADMIN — Medication 1000 MILLIUNIT(S)/MIN: at 10:33

## 2021-02-15 RX ADMIN — Medication 975 MILLIGRAM(S): at 10:30

## 2021-02-15 RX ADMIN — SODIUM CHLORIDE 3 MILLILITER(S): 9 INJECTION INTRAMUSCULAR; INTRAVENOUS; SUBCUTANEOUS at 22:51

## 2021-02-15 RX ADMIN — Medication 30 MILLIGRAM(S): at 16:26

## 2021-02-15 RX ADMIN — Medication 30 MILLIGRAM(S): at 09:09

## 2021-02-15 RX ADMIN — Medication 975 MILLIGRAM(S): at 21:32

## 2021-02-15 NOTE — CHART NOTE - NSCHARTNOTEFT_GEN_A_CORE
Provider contacted by nursing for patient complaint of chest discomfort when breathing while lying down. Patient seen and examined at bedside. Patient report that she feels pain in her ribs below her breast when she is lying down and taking deep breaths. She states she has no pain or discomfort while sitting up. She denies dizziness, lightheadedness, palpitations and shortness of breath. She states she can reproduce the pain by pressing on those ribs.    Vital Signs Last 24 Hrs  T(C): 37.1 (15 Feb 2021 20:30), Max: 37.1 (15 Feb 2021 20:30)  T(F): 98.8 (15 Feb 2021 20:30), Max: 98.8 (15 Feb 2021 20:30)  HR: 111 (15 Feb 2021 21:) (100 - 118)  BP: 143/90 (15 Feb 2021 21:02) (125/67 - 147/88)  RR: 18 (15 Feb 2021 21:) (18 - 18)  SpO2: 97% (15 Feb 2021 21:) (97% - 98%)    Gen: NAD, AOx3  CV: tachycardic, regular rhythm  Pulm: CTAB  Chest: mild discomfort with palpation below breast (about ribs 7-9)  Abd: soft, nontender, nondistended, +BS, uterus firm at umbilicus  Ext: trace symmetric bilateral edema, no tenderness, no erythema                          9.9    10.81 )-----------( 203      ( 2021 08:29 )             30.9     EKG: sinus tachycardia    26yo  now PPD#0 s/p  at 37w3d following IOL 2/2 gHTN  - EKG showing sinus tachycardia  - Rib pain reproducible on exam with palpation  - O2 saturation 97-98% on RA  - Suspect musculoskeletal etiology of chest discomfort over much less likely cardiac origin  - BPs in moderate range, will continue to monitor  - Repeat CBC now  - Will continue to monitor  - Continue routine postpartum care    Discussed with Dr. Rocha

## 2021-02-16 LAB
ALBUMIN SERPL ELPH-MCNC: 2.1 G/DL — LOW (ref 3.3–5)
ALP SERPL-CCNC: 132 U/L — HIGH (ref 40–120)
ALT FLD-CCNC: 19 U/L — SIGNIFICANT CHANGE UP (ref 12–78)
ANION GAP SERPL CALC-SCNC: 9 MMOL/L — SIGNIFICANT CHANGE UP (ref 5–17)
APTT BLD: 28.2 SEC — SIGNIFICANT CHANGE UP (ref 27.5–35.5)
AST SERPL-CCNC: 23 U/L — SIGNIFICANT CHANGE UP (ref 15–37)
BASOPHILS # BLD AUTO: 0.04 K/UL — SIGNIFICANT CHANGE UP (ref 0–0.2)
BASOPHILS NFR BLD AUTO: 0.3 % — SIGNIFICANT CHANGE UP (ref 0–2)
BILIRUB SERPL-MCNC: 0.2 MG/DL — SIGNIFICANT CHANGE UP (ref 0.2–1.2)
BUN SERPL-MCNC: 6 MG/DL — LOW (ref 7–23)
CALCIUM SERPL-MCNC: 8.5 MG/DL — SIGNIFICANT CHANGE UP (ref 8.5–10.1)
CHLORIDE SERPL-SCNC: 113 MMOL/L — HIGH (ref 96–108)
CO2 SERPL-SCNC: 21 MMOL/L — LOW (ref 22–31)
CREAT SERPL-MCNC: 0.54 MG/DL — SIGNIFICANT CHANGE UP (ref 0.5–1.3)
EOSINOPHIL # BLD AUTO: 0.06 K/UL — SIGNIFICANT CHANGE UP (ref 0–0.5)
EOSINOPHIL NFR BLD AUTO: 0.5 % — SIGNIFICANT CHANGE UP (ref 0–6)
FIBRINOGEN PPP-MCNC: 842 MG/DL — HIGH (ref 290–520)
GLUCOSE SERPL-MCNC: 72 MG/DL — SIGNIFICANT CHANGE UP (ref 70–99)
HCT VFR BLD CALC: 25.3 % — LOW (ref 34.5–45)
HCT VFR BLD CALC: 26.5 % — LOW (ref 34.5–45)
HGB BLD-MCNC: 7.9 G/DL — LOW (ref 11.5–15.5)
HGB BLD-MCNC: 8.2 G/DL — LOW (ref 11.5–15.5)
IMM GRANULOCYTES NFR BLD AUTO: 1.1 % — SIGNIFICANT CHANGE UP (ref 0–1.5)
INR BLD: 1.01 RATIO — SIGNIFICANT CHANGE UP (ref 0.88–1.16)
LDH SERPL L TO P-CCNC: 197 U/L — SIGNIFICANT CHANGE UP (ref 84–241)
LYMPHOCYTES # BLD AUTO: 1.48 K/UL — SIGNIFICANT CHANGE UP (ref 1–3.3)
LYMPHOCYTES # BLD AUTO: 12.1 % — LOW (ref 13–44)
MCHC RBC-ENTMCNC: 24.4 PG — LOW (ref 27–34)
MCHC RBC-ENTMCNC: 24.9 PG — LOW (ref 27–34)
MCHC RBC-ENTMCNC: 30.9 GM/DL — LOW (ref 32–36)
MCHC RBC-ENTMCNC: 31.2 GM/DL — LOW (ref 32–36)
MCV RBC AUTO: 78.1 FL — LOW (ref 80–100)
MCV RBC AUTO: 80.5 FL — SIGNIFICANT CHANGE UP (ref 80–100)
MONOCYTES # BLD AUTO: 0.78 K/UL — SIGNIFICANT CHANGE UP (ref 0–0.9)
MONOCYTES NFR BLD AUTO: 6.4 % — SIGNIFICANT CHANGE UP (ref 2–14)
NEUTROPHILS # BLD AUTO: 9.78 K/UL — HIGH (ref 1.8–7.4)
NEUTROPHILS NFR BLD AUTO: 79.6 % — HIGH (ref 43–77)
PLATELET # BLD AUTO: 162 K/UL — SIGNIFICANT CHANGE UP (ref 150–400)
PLATELET # BLD AUTO: 206 K/UL — SIGNIFICANT CHANGE UP (ref 150–400)
POTASSIUM SERPL-MCNC: 4 MMOL/L — SIGNIFICANT CHANGE UP (ref 3.5–5.3)
POTASSIUM SERPL-SCNC: 4 MMOL/L — SIGNIFICANT CHANGE UP (ref 3.5–5.3)
PROT SERPL-MCNC: 5.4 GM/DL — LOW (ref 6–8.3)
PROTHROM AB SERPL-ACNC: 11.7 SEC — SIGNIFICANT CHANGE UP (ref 10.6–13.6)
RBC # BLD: 3.24 M/UL — LOW (ref 3.8–5.2)
RBC # BLD: 3.29 M/UL — LOW (ref 3.8–5.2)
RBC # FLD: 15.4 % — HIGH (ref 10.3–14.5)
RBC # FLD: 15.5 % — HIGH (ref 10.3–14.5)
SODIUM SERPL-SCNC: 143 MMOL/L — SIGNIFICANT CHANGE UP (ref 135–145)
URATE SERPL-MCNC: 5.8 MG/DL — SIGNIFICANT CHANGE UP (ref 2.5–7)
WBC # BLD: 12.19 K/UL — HIGH (ref 3.8–10.5)
WBC # BLD: 12.28 K/UL — HIGH (ref 3.8–10.5)
WBC # FLD AUTO: 12.19 K/UL — HIGH (ref 3.8–10.5)
WBC # FLD AUTO: 12.28 K/UL — HIGH (ref 3.8–10.5)

## 2021-02-16 RX ORDER — ASCORBIC ACID 60 MG
500 TABLET,CHEWABLE ORAL DAILY
Refills: 0 | Status: DISCONTINUED | OUTPATIENT
Start: 2021-02-16 | End: 2021-02-17

## 2021-02-16 RX ORDER — FERROUS SULFATE 325(65) MG
325 TABLET ORAL DAILY
Refills: 0 | Status: DISCONTINUED | OUTPATIENT
Start: 2021-02-16 | End: 2021-02-17

## 2021-02-16 RX ADMIN — Medication 975 MILLIGRAM(S): at 08:40

## 2021-02-16 RX ADMIN — Medication 500 MILLIGRAM(S): at 15:48

## 2021-02-16 RX ADMIN — SODIUM CHLORIDE 3 MILLILITER(S): 9 INJECTION INTRAMUSCULAR; INTRAVENOUS; SUBCUTANEOUS at 06:09

## 2021-02-16 RX ADMIN — Medication 975 MILLIGRAM(S): at 15:48

## 2021-02-16 RX ADMIN — Medication 30 MILLILITER(S): at 13:06

## 2021-02-16 RX ADMIN — Medication 30 MILLILITER(S): at 22:24

## 2021-02-16 RX ADMIN — Medication 30 MILLIGRAM(S): at 00:22

## 2021-02-16 RX ADMIN — Medication 975 MILLIGRAM(S): at 21:13

## 2021-02-16 RX ADMIN — Medication 30 MILLIGRAM(S): at 06:43

## 2021-02-16 RX ADMIN — Medication 325 MILLIGRAM(S): at 15:48

## 2021-02-16 RX ADMIN — Medication 600 MILLIGRAM(S): at 13:07

## 2021-02-16 RX ADMIN — Medication 30 MILLILITER(S): at 08:40

## 2021-02-16 NOTE — PROGRESS NOTE ADULT - SUBJECTIVE AND OBJECTIVE BOX
S: Patient doing well. Minimal lochia. Pain controlled. Minor upper abd/lower chest pain, patient equates to prior pain with reflux.     O: Vital Signs Last 24 Hrs  T(C): 36.9 (2021 08:47), Max: 37.1 (15 Feb 2021 20:30)  T(F): 98.4 (2021 08:47), Max: 98.8 (15 Feb 2021 20:30)  HR: 101 (2021 08:47) (100 - 118)  BP: 133/77 (2021 08:47) (121/76 - 147/88)  BP(mean): --  RR: 17 (2021 08:47) (17 - 18)  SpO2: 100% (2021 08:47) (97% - 100%)    Gen: NAD  Abd: soft, NT, ND, fundus firm below umbilicus  Lochia: moderate  Ext: no tenderness    Labs:                        7.9    12.28 )-----------( 162      ( 2021 07:46 )             25.3       A: 25y PPD#1 s/p  doing well. Known mild pre-eclampsia    Plan: Medicated for GI pain. Will continue close monitoring of symptoms.

## 2021-02-17 ENCOUNTER — TRANSCRIPTION ENCOUNTER (OUTPATIENT)
Age: 26
End: 2021-02-17

## 2021-02-17 VITALS
DIASTOLIC BLOOD PRESSURE: 85 MMHG | SYSTOLIC BLOOD PRESSURE: 135 MMHG | OXYGEN SATURATION: 98 % | RESPIRATION RATE: 16 BRPM | HEART RATE: 88 BPM | TEMPERATURE: 98 F

## 2021-02-17 RX ORDER — DIBUCAINE 1 %
1 OINTMENT (GRAM) RECTAL
Qty: 0 | Refills: 0 | DISCHARGE
Start: 2021-02-17

## 2021-02-17 RX ORDER — LANOLIN
1 OINTMENT (GRAM) TOPICAL
Qty: 0 | Refills: 0 | DISCHARGE
Start: 2021-02-17

## 2021-02-17 RX ORDER — BENZOCAINE 10 %
1 GEL (GRAM) MUCOUS MEMBRANE
Qty: 0 | Refills: 0 | DISCHARGE
Start: 2021-02-17

## 2021-02-17 RX ORDER — AER TRAVELER 0.5 G/1
1 SOLUTION RECTAL; TOPICAL
Qty: 0 | Refills: 0 | DISCHARGE
Start: 2021-02-17

## 2021-02-17 RX ORDER — ACETAMINOPHEN 500 MG
3 TABLET ORAL
Qty: 0 | Refills: 0 | DISCHARGE
Start: 2021-02-17

## 2021-02-17 RX ORDER — ASCORBIC ACID 60 MG
1 TABLET,CHEWABLE ORAL
Qty: 0 | Refills: 0 | DISCHARGE
Start: 2021-02-17

## 2021-02-17 RX ORDER — IBUPROFEN 200 MG
1 TABLET ORAL
Qty: 0 | Refills: 0 | DISCHARGE
Start: 2021-02-17

## 2021-02-17 RX ORDER — HYDROCORTISONE 1 %
1 OINTMENT (GRAM) TOPICAL
Qty: 0 | Refills: 0 | DISCHARGE
Start: 2021-02-17

## 2021-02-17 RX ORDER — FERROUS SULFATE 325(65) MG
1 TABLET ORAL
Qty: 0 | Refills: 0 | DISCHARGE
Start: 2021-02-17

## 2021-02-17 RX ADMIN — Medication 600 MILLIGRAM(S): at 00:08

## 2021-02-17 RX ADMIN — Medication 600 MILLIGRAM(S): at 06:20

## 2021-02-17 RX ADMIN — Medication 325 MILLIGRAM(S): at 09:26

## 2021-02-17 RX ADMIN — Medication 1 TABLET(S): at 09:26

## 2021-02-17 RX ADMIN — Medication 975 MILLIGRAM(S): at 03:23

## 2021-02-17 NOTE — PROGRESS NOTE ADULT - SUBJECTIVE AND OBJECTIVE BOX
S: Patient doing well. Minimal lochia. No complaints.   denies ha/ visual changes ruq pain  O: Vital Signs Last 24 Hrs  T(C): 37.1 (2021 05:05), Max: 37.1 (2021 05:05)  T(F): 98.7 (2021 05:05), Max: 98.7 (2021 05:05)  HR: 91 (2021 05:05) (90 - 101)  BP: 114/70 (2021 05:05) (114/70 - 134/85)  BP(mean): --  RR: 16 (2021 05:05) (16 - 18)  SpO2: 98% (2021 05:05) (97% - 100%)    Gen: NAD  Abd: soft, NT, ND, fundus firm below umbilicus  Ext: no tenderness    Labs:                        8.2    12.19 )-----------( 206      ( 2021 18:59 )             26.5            A: 25y PPD# 2 s/p      Doing well    Plan:   Discharge home.  verbal discharge instructions d/w patient  Nothing in vagina and no heavy lifting for 6 weeks.   Follow up 6 weeks for post partum visit.  Call office for any fevers, chills, heavy vaginal bleeding, symptoms of depression, or any other concerning symptoms.  Continue motrin 600 mg every 6 hours.     S: Patient doing well. Minimal lochia. No complaints.   denies ha/ visual changes ruq pain  +bm  no dizziness  breast feeding  O: Vital Signs Last 24 Hrs  T(C): 37.1 (2021 05:05), Max: 37.1 (2021 05:05)  T(F): 98.7 (2021 05:05), Max: 98.7 (2021 05:05)  HR: 91 (2021 05:05) (90 - 101)  BP: 114/70 (2021 05:05) (114/70 - 134/85)  BP(mean): --  RR: 16 (2021 05:05) (16 - 18)  SpO2: 98% (2021 05:05) (97% - 100%)    Gen: NAD  Abd: soft, NT, ND, fundus firm below umbilicus  Ext: no tenderness    Labs:                        8.2    12.19 )-----------( 206      ( 2021 18:59 )             26.5            A: 25y PPD# 2 s/p      Doing well    Plan:   Discharge home.  verbal discharge instructions d/w patient  Nothing in vagina and no heavy lifting for 6 weeks.   Follow up 1 weeks bp check/ 6 weeks for post partum visit.  Call office for any fevers, chills, heavy vaginal bleeding, symptoms of depression, or any other concerning symptoms.  Continue motrin 600 mg every 6 hours.  boy no circ

## 2021-02-17 NOTE — DISCHARGE NOTE OB - PATIENT PORTAL LINK FT
You can access the FollowMyHealth Patient Portal offered by [facility] by registering at the following website: [email or web address]. By joining [facility]’s FollowMyHealth portal, you will also be able to view your health information using other applications (apps) compatible with our system.

## 2021-02-17 NOTE — DISCHARGE NOTE OB - MEDICATION SUMMARY - MEDICATIONS TO TAKE
I will START or STAY ON the medications listed below when I get home from the hospital:    acetaminophen 325 mg oral tablet  -- 3 tab(s) by mouth   -- Indication: For     ibuprofen 600 mg oral tablet  -- 1 tab(s) by mouth every 6 hours  -- Indication: For     benzocaine 20% topical spray  -- 1 spray(s) on skin every 6 hours, As needed, for Perineal discomfort  -- Indication: For     dibucaine 1% topical ointment  -- 1 application on skin every 6 hours, As needed, Perineal discomfort  -- Indication: For     lanolin topical ointment  -- 1 application on skin every 6 hours, As needed, nipple soreness  -- Indication: For     hydrocortisone 1% topical cream  -- 1 application on skin every 6 hours, As needed, Moderate Pain (4-6)  -- Indication: For     witch hazel 50% topical pad  -- 1 application on skin every 4 hours, As needed, Perineal discomfort  -- Indication: For     ferrous sulfate 325 mg (65 mg elemental iron) oral tablet  -- 1 tab(s) by mouth once a day  -- Indication: For     Prenatal Multivitamins with Folic Acid 1 mg oral tablet  -- 1 tab(s) by mouth once a day  -- Indication: For     ascorbic acid 500 mg oral tablet  -- 1 tab(s) by mouth once a day  -- Indication: For

## 2021-02-17 NOTE — DISCHARGE NOTE OB - MEDICATION SUMMARY - MEDICATIONS TO STOP TAKING
I will STOP taking the medications listed below when I get home from the hospital:    Keflex 500 mg oral capsule  -- 1 cap(s) by mouth 2 times a day   -- Finish all this medication unless otherwise directed by prescriber.

## 2021-02-17 NOTE — DISCHARGE NOTE OB - CARE PROVIDER_API CALL
Fiorella Mckeon (DO)  Obstetrics and Gynecology  [address], [address] [place] [address]  Phone: [phone]  Fax: [phone]  Follow Up Time:

## 2021-02-20 DIAGNOSIS — Z3A.37 37 WEEKS GESTATION OF PREGNANCY: ICD-10-CM

## 2021-04-19 ENCOUNTER — NON-APPOINTMENT (OUTPATIENT)
Age: 26
End: 2021-04-19

## 2021-04-30 ENCOUNTER — APPOINTMENT (OUTPATIENT)
Dept: OTOLARYNGOLOGY | Facility: CLINIC | Age: 26
End: 2021-04-30

## 2022-04-04 ENCOUNTER — EMERGENCY (EMERGENCY)
Facility: HOSPITAL | Age: 27
LOS: 0 days | Discharge: HOME | End: 2022-04-04
Attending: EMERGENCY MEDICINE
Payer: COMMERCIAL

## 2022-04-04 VITALS — HEIGHT: 62 IN | WEIGHT: 169.98 LBS

## 2022-04-04 VITALS
RESPIRATION RATE: 16 BRPM | DIASTOLIC BLOOD PRESSURE: 90 MMHG | TEMPERATURE: 98 F | HEART RATE: 81 BPM | OXYGEN SATURATION: 99 % | SYSTOLIC BLOOD PRESSURE: 141 MMHG

## 2022-04-04 DIAGNOSIS — N93.9 ABNORMAL UTERINE AND VAGINAL BLEEDING, UNSPECIFIED: ICD-10-CM

## 2022-04-04 LAB
ALBUMIN SERPL ELPH-MCNC: 3.7 G/DL — SIGNIFICANT CHANGE UP (ref 3.3–5)
ALP SERPL-CCNC: 66 U/L — SIGNIFICANT CHANGE UP (ref 40–120)
ALT FLD-CCNC: 20 U/L — SIGNIFICANT CHANGE UP (ref 12–78)
ANION GAP SERPL CALC-SCNC: 5 MMOL/L — SIGNIFICANT CHANGE UP (ref 5–17)
AST SERPL-CCNC: 12 U/L — LOW (ref 15–37)
BASOPHILS # BLD AUTO: 0.07 K/UL — SIGNIFICANT CHANGE UP (ref 0–0.2)
BASOPHILS NFR BLD AUTO: 0.8 % — SIGNIFICANT CHANGE UP (ref 0–2)
BILIRUB SERPL-MCNC: 0.2 MG/DL — SIGNIFICANT CHANGE UP (ref 0.2–1.2)
BUN SERPL-MCNC: 12 MG/DL — SIGNIFICANT CHANGE UP (ref 7–23)
CALCIUM SERPL-MCNC: 9 MG/DL — SIGNIFICANT CHANGE UP (ref 8.5–10.1)
CHLORIDE SERPL-SCNC: 107 MMOL/L — SIGNIFICANT CHANGE UP (ref 96–108)
CO2 SERPL-SCNC: 25 MMOL/L — SIGNIFICANT CHANGE UP (ref 22–31)
CREAT SERPL-MCNC: 0.77 MG/DL — SIGNIFICANT CHANGE UP (ref 0.5–1.3)
EGFR: 108 ML/MIN/1.73M2 — SIGNIFICANT CHANGE UP
EOSINOPHIL # BLD AUTO: 0.16 K/UL — SIGNIFICANT CHANGE UP (ref 0–0.5)
EOSINOPHIL NFR BLD AUTO: 1.7 % — SIGNIFICANT CHANGE UP (ref 0–6)
GLUCOSE SERPL-MCNC: 97 MG/DL — SIGNIFICANT CHANGE UP (ref 70–99)
HCG SERPL-ACNC: 1 MIU/ML — SIGNIFICANT CHANGE UP
HCT VFR BLD CALC: 38.8 % — SIGNIFICANT CHANGE UP (ref 34.5–45)
HGB BLD-MCNC: 13.6 G/DL — SIGNIFICANT CHANGE UP (ref 11.5–15.5)
IMM GRANULOCYTES NFR BLD AUTO: 0.2 % — SIGNIFICANT CHANGE UP (ref 0–1.5)
LYMPHOCYTES # BLD AUTO: 2.82 K/UL — SIGNIFICANT CHANGE UP (ref 1–3.3)
LYMPHOCYTES # BLD AUTO: 30.7 % — SIGNIFICANT CHANGE UP (ref 13–44)
MCHC RBC-ENTMCNC: 30 PG — SIGNIFICANT CHANGE UP (ref 27–34)
MCHC RBC-ENTMCNC: 35.1 GM/DL — SIGNIFICANT CHANGE UP (ref 32–36)
MCV RBC AUTO: 85.5 FL — SIGNIFICANT CHANGE UP (ref 80–100)
MONOCYTES # BLD AUTO: 0.63 K/UL — SIGNIFICANT CHANGE UP (ref 0–0.9)
MONOCYTES NFR BLD AUTO: 6.8 % — SIGNIFICANT CHANGE UP (ref 2–14)
NEUTROPHILS # BLD AUTO: 5.5 K/UL — SIGNIFICANT CHANGE UP (ref 1.8–7.4)
NEUTROPHILS NFR BLD AUTO: 59.8 % — SIGNIFICANT CHANGE UP (ref 43–77)
PLATELET # BLD AUTO: 259 K/UL — SIGNIFICANT CHANGE UP (ref 150–400)
POTASSIUM SERPL-MCNC: 3.6 MMOL/L — SIGNIFICANT CHANGE UP (ref 3.5–5.3)
POTASSIUM SERPL-SCNC: 3.6 MMOL/L — SIGNIFICANT CHANGE UP (ref 3.5–5.3)
PROT SERPL-MCNC: 7.9 GM/DL — SIGNIFICANT CHANGE UP (ref 6–8.3)
RBC # BLD: 4.54 M/UL — SIGNIFICANT CHANGE UP (ref 3.8–5.2)
RBC # FLD: 12.5 % — SIGNIFICANT CHANGE UP (ref 10.3–14.5)
SODIUM SERPL-SCNC: 137 MMOL/L — SIGNIFICANT CHANGE UP (ref 135–145)
WBC # BLD: 9.2 K/UL — SIGNIFICANT CHANGE UP (ref 3.8–10.5)
WBC # FLD AUTO: 9.2 K/UL — SIGNIFICANT CHANGE UP (ref 3.8–10.5)

## 2022-04-04 PROCEDURE — 86900 BLOOD TYPING SEROLOGIC ABO: CPT

## 2022-04-04 PROCEDURE — 84702 CHORIONIC GONADOTROPIN TEST: CPT

## 2022-04-04 PROCEDURE — 85025 COMPLETE CBC W/AUTO DIFF WBC: CPT

## 2022-04-04 PROCEDURE — 99284 EMERGENCY DEPT VISIT MOD MDM: CPT | Mod: 25

## 2022-04-04 PROCEDURE — 80053 COMPREHEN METABOLIC PANEL: CPT

## 2022-04-04 PROCEDURE — 99285 EMERGENCY DEPT VISIT HI MDM: CPT

## 2022-04-04 PROCEDURE — 36415 COLL VENOUS BLD VENIPUNCTURE: CPT

## 2022-04-04 PROCEDURE — 86850 RBC ANTIBODY SCREEN: CPT

## 2022-04-04 PROCEDURE — 86901 BLOOD TYPING SEROLOGIC RH(D): CPT

## 2022-04-04 PROCEDURE — 76830 TRANSVAGINAL US NON-OB: CPT | Mod: 26

## 2022-04-04 PROCEDURE — 76830 TRANSVAGINAL US NON-OB: CPT

## 2022-04-04 NOTE — ED STATDOCS - PROGRESS NOTE DETAILS
27 y/o F with no PMh presents with vaginal bleeding x 3 days. States she had positive home pregnancy test apx 9 days ago. States she was passing blood clots "the size of a peach" today. LMP 22. +cramping. States she has obgyn follow up tomorrow. PE: Well appearing. Cardiac: s1s2, RRR. lungs: CTAB. Abdomen: NBS x4, soft, nontender. A/P: ?threatened , ?missed . Plan for labs, US, reassess. - Kyle Yates PA-C

## 2022-04-04 NOTE — ED STATDOCS - PATIENT PORTAL LINK FT
You can access the FollowMyHealth Patient Portal offered by [facility] by registering at the following website: [email or web address]. By joining [facility]’s FollowMyHealth portal, you will also be able to view your health information using other applications (apps) compatible with our system.

## 2022-04-04 NOTE — ED STATDOCS - CLINICAL SUMMARY MEDICAL DECISION MAKING FREE TEXT BOX
pt with vaginal bleeding in early pregnancy. vaginal bleeding is improving now. will check labs, ultrasound and reassess

## 2022-04-04 NOTE — ED STATDOCS - ATTENDING CONTRIBUTION TO CARE
I, Tung Marin, performed the initial face to face bedside interview with this patient regarding history of present illness, review of symptoms and relevant past medical, social and family history.  I completed an independent physical examination.  I was the initial provider who evaluated this patient. I have signed out the follow up of any pending tests (i.e. labs, radiological studies) to the VALERIE.  I have communicated the patient’s plan of care and disposition with the VALERIE.  The history, relevant review of systems, past medical and surgical history, medical decision making, and physical examination was documented by the scribe in my presence and I attest to the accuracy of the documentation.

## 2022-04-04 NOTE — ED ADULT NURSE NOTE - NSIMPLEMENTINTERV_GEN_ALL_ED
Implemented All Universal Safety Interventions:  [place] to call system. Call bell, personal items and telephone within reach. Instruct patient to call for assistance. Room bathroom lighting operational. Non-slip footwear when patient is off stretcher. Physically safe environment: no spills, clutter or unnecessary equipment. Stretcher in lowest position, wheels locked, appropriate side rails in place.

## 2022-04-04 NOTE — ED ADULT TRIAGE NOTE - CHIEF COMPLAINT QUOTE
Pt comes to the ED complaining of vaginal bleeding since Saturday. Pt states that a little while ago she passed a clot the size of a peach. Pt states that she is 6 weeks and 2 days pregnant.

## 2022-04-04 NOTE — ED STATDOCS - NS ED ATTENDING STATEMENT MOD
This was a shared visit with the VALERIE. I reviewed and verified the documentation and independently performed the documented:

## 2022-04-04 NOTE — ED STATDOCS - NSFOLLOWUPINSTRUCTIONS_ED_ALL_ED_FT
Vaginal Bleeding During Pregnancy, First Trimester      A small amount of bleeding from the vagina, or spotting, is common during early pregnancy. Some bleeding may be related to the pregnancy, and some may not. In many cases, the bleeding is normal and is not a problem. However, bleeding can also be a sign of something serious.    Normal things that may cause bleeding during the first trimester:  •Implantation of the fertilized egg in the lining of the uterus.      •Rapid changes in blood vessels. This is caused by changes that are happening to the body during pregnancy.      •Sex.      •Pelvic exams.      Abnormal things that may cause bleeding during the first trimester include:  •Infection or inflammation of the cervix.      •Growths or polyps on the cervix.      •Miscarriage or threatened miscarriage.      •Pregnancy that is growing outside of the uterus (ectopic pregnancy).      •A fertilized egg that becomes a mass of tissue (molar pregnancy).      Tell your health care provider right away if there is any bleeding from your vagina.      Follow these instructions at home:      Monitoring your bleeding      Monitor your bleeding.  •Pay attention to any changes in your symptoms. Let your health care provider know about any concerns.      •Try to understand when the bleeding occurs. Does the bleeding start on its own, or does it start after something is done, such as sex or a pelvic exam?    •Use a diary to record the things you see about your bleeding, including:   •The kind of bleeding you are having. Does the bleeding start and stop irregularly, or is it a constant flow?      •The severity of your bleeding. Is the bleeding heavy or light?      •The number of pads you use each day, how often you change them, and how soaked they are.        •Tell your health care provider if you pass tissue. He or she may want to see it.      Activity     •Follow instructions from your health care provider about limiting your activity. Ask what activities are safe for you.      • Do not have sex until your health care provider says that this is safe.      •If needed, make plans for someone to help with your regular activities.      General instructions     •Take over-the-counter and prescription medicines only as told by your health care provider.      • Do not take aspirin because it can cause bleeding.      • Do not use tampons or douche.      •Keep all follow-up visits. This is important.        Contact a health care provider if:    •You have vaginal bleeding during any part of your pregnancy.      •You have cramps or labor pains.      •You have a fever or chills.        Get help right away if:    •You have severe cramps in your back or abdomen.      •You pass large clots or a large amount of tissue from your vagina.      •Your bleeding increases.      •You feel light-headed or weak, or you faint.      •You are leaking fluid or have a gush of fluid from your vagina.        Summary    •A small amount of bleeding from the vagina is common during early pregnancy.      •Be sure to tell your health care provider about any vaginal bleeding right away.      •Try to understand when bleeding occurs. Does bleeding occur on its own, or does it occur after something is done, such as sex or pelvic exams?      •Keep all follow-up visits. This is important.      This information is not intended to replace advice given to you by your health care provider. Make sure you discuss any questions you have with your health care provider.

## 2022-04-04 NOTE — ED STATDOCS - OBJECTIVE STATEMENT
26 F no hx here c/o intermittent vaginal bleeding x 3 days. pt states that at 8 PM today she passed a blood clot the size of a peach. LMP 2/19. no vomiting. no diarrhea. pt endorse intermittent abdominal cramping. pt states this is her second pregnancy. pt has an OBGYN appointment tomorrow.

## 2022-04-04 NOTE — ED ADULT NURSE NOTE - OBJECTIVE STATEMENT
Pt comes to the ED complaining of vaginal bleeding since Saturday. Pt states that a little while ago she passed a clot the size of a peach. Pt states that she is 6 weeks and 2 days pregnant. pt called her OB, Dr. Chu and advised her to come to the ED

## 2022-08-09 NOTE — ED PROVIDER NOTE - DISCHARGE DATE
08/09/22 12:19 PM     See documentation in the VB [employer] SmartForm       Inell Bosworth 25-Sep[birth year]2020

## 2022-08-24 NOTE — ED ADULT NURSE NOTE - NSFALLRSKINDICATORS_ED_ALL_ED
What Type Of Note Output Would You Prefer (Optional)?: Bullet Format
Hpi Title: Evaluation of a Skin Lesion
How Severe Are Your Spot(S)?: moderate
Have Your Spot(S) Been Treated In The Past?: has not been treated
no

## 2022-09-14 NOTE — PATIENT PROFILE OB - BABY A: CORD CHARACTERISTICS, DELIVERY
September 15, 2022        Nate Mackey MD  [address]  [address]  [facility] [address]             Leno Fernandez  Peds [facility] 2nd[facility]  [address], [address]  [facility] [address]  Phone: [phone]  Fax: [phone]   Patient: So Brown   MR Number: [id number]   YOB: 2011   Date of Visit: 9/14/2022       Dear Dr. Mackey:    Thank you for referring So Brown to me for evaluation. Attached you will find relevant portions of my assessment and plan of care.    If you have questions, please do not hesitate to call me. I look forward to following So Brown along with you.    Sincerely,      Florecita Reed PA-C            CC  Partha Garcia MD    Enclosure         
x1/nuchal cord

## 2022-12-30 NOTE — ED ADULT NURSE NOTE - HIV OFFER
ibuprofen 600 mg oral tablet: 1 tab(s) orally every 6 hours, As needed, Mild pain or headache   Opt out ciprofloxacin 100 mg oral tablet: 4 tab(s) orally 2 times a day   metroNIDAZOLE 500 mg oral tablet: 1 tab(s) orally 3 times a day

## 2023-01-24 ENCOUNTER — OUTPATIENT (OUTPATIENT)
Dept: INPATIENT UNIT | Facility: HOSPITAL | Age: 28
LOS: 1 days | Discharge: HOME | End: 2023-01-24
Payer: COMMERCIAL

## 2023-01-24 DIAGNOSIS — O26.899 OTHER SPECIFIED PREGNANCY RELATED CONDITIONS, UNSPECIFIED TRIMESTER: ICD-10-CM

## 2023-01-24 DIAGNOSIS — Z3A.00 WEEKS OF GESTATION OF PREGNANCY NOT SPECIFIED: ICD-10-CM

## 2023-01-24 LAB
ALBUMIN SERPL ELPH-MCNC: 2.7 G/DL — LOW (ref 3.3–5)
ALP SERPL-CCNC: 106 U/L — SIGNIFICANT CHANGE UP (ref 40–120)
ALT FLD-CCNC: 16 U/L — SIGNIFICANT CHANGE UP (ref 12–78)
ANION GAP SERPL CALC-SCNC: 7 MMOL/L — SIGNIFICANT CHANGE UP (ref 5–17)
APPEARANCE UR: CLEAR — SIGNIFICANT CHANGE UP
APTT BLD: 27.5 SEC — SIGNIFICANT CHANGE UP (ref 27.5–35.5)
AST SERPL-CCNC: 16 U/L — SIGNIFICANT CHANGE UP (ref 15–37)
BACTERIA # UR AUTO: NEGATIVE — SIGNIFICANT CHANGE UP
BASOPHILS # BLD AUTO: 0.04 K/UL — SIGNIFICANT CHANGE UP (ref 0–0.2)
BASOPHILS NFR BLD AUTO: 0.3 % — SIGNIFICANT CHANGE UP (ref 0–2)
BILIRUB SERPL-MCNC: 0.2 MG/DL — SIGNIFICANT CHANGE UP (ref 0.2–1.2)
BILIRUB UR-MCNC: NEGATIVE — SIGNIFICANT CHANGE UP
BUN SERPL-MCNC: 7 MG/DL — SIGNIFICANT CHANGE UP (ref 7–23)
CALCIUM SERPL-MCNC: 8.7 MG/DL — SIGNIFICANT CHANGE UP (ref 8.5–10.1)
CHLORIDE SERPL-SCNC: 108 MMOL/L — SIGNIFICANT CHANGE UP (ref 96–108)
CO2 SERPL-SCNC: 22 MMOL/L — SIGNIFICANT CHANGE UP (ref 22–31)
COLOR SPEC: YELLOW — SIGNIFICANT CHANGE UP
CREAT ?TM UR-MCNC: 59 MG/DL — SIGNIFICANT CHANGE UP
CREAT SERPL-MCNC: 0.63 MG/DL — SIGNIFICANT CHANGE UP (ref 0.5–1.3)
DIFF PNL FLD: ABNORMAL
EGFR: 123 ML/MIN/1.73M2 — SIGNIFICANT CHANGE UP
EOSINOPHIL # BLD AUTO: 0.06 K/UL — SIGNIFICANT CHANGE UP (ref 0–0.5)
EOSINOPHIL NFR BLD AUTO: 0.5 % — SIGNIFICANT CHANGE UP (ref 0–6)
EPI CELLS # UR: NEGATIVE — SIGNIFICANT CHANGE UP
FIBRINOGEN PPP-MCNC: 820 MG/DL — HIGH (ref 330–520)
GLUCOSE SERPL-MCNC: 90 MG/DL — SIGNIFICANT CHANGE UP (ref 70–99)
GLUCOSE UR QL: NEGATIVE — SIGNIFICANT CHANGE UP
HCT VFR BLD CALC: 35.5 % — SIGNIFICANT CHANGE UP (ref 34.5–45)
HGB BLD-MCNC: 12 G/DL — SIGNIFICANT CHANGE UP (ref 11.5–15.5)
IMM GRANULOCYTES NFR BLD AUTO: 0.9 % — SIGNIFICANT CHANGE UP (ref 0–0.9)
INR BLD: 0.97 RATIO — SIGNIFICANT CHANGE UP (ref 0.88–1.16)
KETONES UR-MCNC: NEGATIVE — SIGNIFICANT CHANGE UP
LDH SERPL L TO P-CCNC: 174 U/L — SIGNIFICANT CHANGE UP (ref 84–241)
LEUKOCYTE ESTERASE UR-ACNC: NEGATIVE — SIGNIFICANT CHANGE UP
LYMPHOCYTES # BLD AUTO: 1.92 K/UL — SIGNIFICANT CHANGE UP (ref 1–3.3)
LYMPHOCYTES # BLD AUTO: 16.5 % — SIGNIFICANT CHANGE UP (ref 13–44)
MCHC RBC-ENTMCNC: 28.7 PG — SIGNIFICANT CHANGE UP (ref 27–34)
MCHC RBC-ENTMCNC: 33.8 GM/DL — SIGNIFICANT CHANGE UP (ref 32–36)
MCV RBC AUTO: 84.9 FL — SIGNIFICANT CHANGE UP (ref 80–100)
MONOCYTES # BLD AUTO: 0.67 K/UL — SIGNIFICANT CHANGE UP (ref 0–0.9)
MONOCYTES NFR BLD AUTO: 5.8 % — SIGNIFICANT CHANGE UP (ref 2–14)
NEUTROPHILS # BLD AUTO: 8.85 K/UL — HIGH (ref 1.8–7.4)
NEUTROPHILS NFR BLD AUTO: 76 % — SIGNIFICANT CHANGE UP (ref 43–77)
NITRITE UR-MCNC: NEGATIVE — SIGNIFICANT CHANGE UP
PH UR: 6 — SIGNIFICANT CHANGE UP (ref 5–8)
PLATELET # BLD AUTO: 171 K/UL — SIGNIFICANT CHANGE UP (ref 150–400)
POTASSIUM SERPL-MCNC: 3.2 MMOL/L — LOW (ref 3.5–5.3)
POTASSIUM SERPL-SCNC: 3.2 MMOL/L — LOW (ref 3.5–5.3)
PROT ?TM UR-MCNC: 7 MG/DL — SIGNIFICANT CHANGE UP (ref 0–12)
PROT SERPL-MCNC: 6.7 GM/DL — SIGNIFICANT CHANGE UP (ref 6–8.3)
PROT UR-MCNC: NEGATIVE — SIGNIFICANT CHANGE UP
PROT/CREAT UR-RTO: 0.1 RATIO — SIGNIFICANT CHANGE UP (ref 0–0.2)
PROTHROM AB SERPL-ACNC: 11.3 SEC — SIGNIFICANT CHANGE UP (ref 10.5–13.4)
RBC # BLD: 4.18 M/UL — SIGNIFICANT CHANGE UP (ref 3.8–5.2)
RBC # FLD: 14.4 % — SIGNIFICANT CHANGE UP (ref 10.3–14.5)
RBC CASTS # UR COMP ASSIST: SIGNIFICANT CHANGE UP /HPF (ref 0–4)
SODIUM SERPL-SCNC: 137 MMOL/L — SIGNIFICANT CHANGE UP (ref 135–145)
SP GR SPEC: 1.01 — SIGNIFICANT CHANGE UP (ref 1.01–1.02)
URATE SERPL-MCNC: 5.4 MG/DL — SIGNIFICANT CHANGE UP (ref 2.5–7)
UROBILINOGEN FLD QL: NEGATIVE — SIGNIFICANT CHANGE UP
WBC # BLD: 11.64 K/UL — HIGH (ref 3.8–10.5)
WBC # FLD AUTO: 11.64 K/UL — HIGH (ref 3.8–10.5)
WBC UR QL: SIGNIFICANT CHANGE UP /HPF (ref 0–5)

## 2023-01-24 PROCEDURE — 85025 COMPLETE CBC W/AUTO DIFF WBC: CPT

## 2023-01-24 PROCEDURE — 80053 COMPREHEN METABOLIC PANEL: CPT

## 2023-01-24 PROCEDURE — 59025 FETAL NON-STRESS TEST: CPT

## 2023-01-24 PROCEDURE — 85384 FIBRINOGEN ACTIVITY: CPT

## 2023-01-24 PROCEDURE — 81001 URINALYSIS AUTO W/SCOPE: CPT

## 2023-01-24 PROCEDURE — 85610 PROTHROMBIN TIME: CPT

## 2023-01-24 PROCEDURE — 82570 ASSAY OF URINE CREATININE: CPT

## 2023-01-24 PROCEDURE — 84156 ASSAY OF PROTEIN URINE: CPT

## 2023-01-24 PROCEDURE — 83615 LACTATE (LD) (LDH) ENZYME: CPT

## 2023-01-24 PROCEDURE — 99214 OFFICE O/P EST MOD 30 MIN: CPT

## 2023-01-24 PROCEDURE — 84550 ASSAY OF BLOOD/URIC ACID: CPT

## 2023-01-24 PROCEDURE — 85730 THROMBOPLASTIN TIME PARTIAL: CPT

## 2023-01-24 PROCEDURE — 36415 COLL VENOUS BLD VENIPUNCTURE: CPT

## 2023-01-25 PROBLEM — Z78.9 OTHER SPECIFIED HEALTH STATUS: Chronic | Status: ACTIVE | Noted: 2022-04-05

## 2023-01-25 LAB — PROT ?TM UR-MCNC: 8 MG/DL — SIGNIFICANT CHANGE UP (ref 0–12)

## 2023-01-26 DIAGNOSIS — Z3A.00 WEEKS OF GESTATION OF PREGNANCY NOT SPECIFIED: ICD-10-CM

## 2023-01-26 DIAGNOSIS — O26.899 OTHER SPECIFIED PREGNANCY RELATED CONDITIONS, UNSPECIFIED TRIMESTER: ICD-10-CM

## 2023-01-30 ENCOUNTER — INPATIENT (INPATIENT)
Facility: HOSPITAL | Age: 28
LOS: 2 days | Discharge: HOME | End: 2023-02-02
Attending: OBSTETRICS & GYNECOLOGY | Admitting: OBSTETRICS & GYNECOLOGY
Payer: COMMERCIAL

## 2023-01-30 VITALS — WEIGHT: 190.04 LBS | HEIGHT: 62 IN

## 2023-01-30 DIAGNOSIS — O13.3 GESTATIONAL [PREGNANCY-INDUCED] HYPERTENSION WITHOUT SIGNIFICANT PROTEINURIA, THIRD TRIMESTER: ICD-10-CM

## 2023-01-30 LAB
ALBUMIN SERPL ELPH-MCNC: 2.7 G/DL — LOW (ref 3.3–5)
ALP SERPL-CCNC: 116 U/L — SIGNIFICANT CHANGE UP (ref 40–120)
ALT FLD-CCNC: 15 U/L — SIGNIFICANT CHANGE UP (ref 12–78)
ANION GAP SERPL CALC-SCNC: 6 MMOL/L — SIGNIFICANT CHANGE UP (ref 5–17)
APPEARANCE UR: CLEAR — SIGNIFICANT CHANGE UP
APTT BLD: 26.9 SEC — LOW (ref 27.5–35.5)
AST SERPL-CCNC: 15 U/L — SIGNIFICANT CHANGE UP (ref 15–37)
BASOPHILS # BLD AUTO: 0.05 K/UL — SIGNIFICANT CHANGE UP (ref 0–0.2)
BASOPHILS NFR BLD AUTO: 0.5 % — SIGNIFICANT CHANGE UP (ref 0–2)
BILIRUB SERPL-MCNC: 0.3 MG/DL — SIGNIFICANT CHANGE UP (ref 0.2–1.2)
BILIRUB UR-MCNC: NEGATIVE — SIGNIFICANT CHANGE UP
BUN SERPL-MCNC: 9 MG/DL — SIGNIFICANT CHANGE UP (ref 7–23)
CALCIUM SERPL-MCNC: 9 MG/DL — SIGNIFICANT CHANGE UP (ref 8.5–10.1)
CHLORIDE SERPL-SCNC: 109 MMOL/L — HIGH (ref 96–108)
CO2 SERPL-SCNC: 21 MMOL/L — LOW (ref 22–31)
COLOR SPEC: YELLOW — SIGNIFICANT CHANGE UP
COVID-19 SPIKE DOMAIN AB INTERP: POSITIVE
COVID-19 SPIKE DOMAIN ANTIBODY RESULT: >250 U/ML — HIGH
CREAT ?TM UR-MCNC: 289 MG/DL — SIGNIFICANT CHANGE UP
CREAT SERPL-MCNC: 0.75 MG/DL — SIGNIFICANT CHANGE UP (ref 0.5–1.3)
DIFF PNL FLD: ABNORMAL
EGFR: 110 ML/MIN/1.73M2 — SIGNIFICANT CHANGE UP
EOSINOPHIL # BLD AUTO: 0.05 K/UL — SIGNIFICANT CHANGE UP (ref 0–0.5)
EOSINOPHIL NFR BLD AUTO: 0.5 % — SIGNIFICANT CHANGE UP (ref 0–6)
FIBRINOGEN PPP-MCNC: 800 MG/DL — HIGH (ref 330–520)
GLUCOSE SERPL-MCNC: 103 MG/DL — HIGH (ref 70–99)
GLUCOSE UR QL: NEGATIVE — SIGNIFICANT CHANGE UP
HCT VFR BLD CALC: 35 % — SIGNIFICANT CHANGE UP (ref 34.5–45)
HGB BLD-MCNC: 12 G/DL — SIGNIFICANT CHANGE UP (ref 11.5–15.5)
IMM GRANULOCYTES NFR BLD AUTO: 0.5 % — SIGNIFICANT CHANGE UP (ref 0–0.9)
INR BLD: 0.96 RATIO — SIGNIFICANT CHANGE UP (ref 0.88–1.16)
KETONES UR-MCNC: ABNORMAL
LDH SERPL L TO P-CCNC: 151 U/L — SIGNIFICANT CHANGE UP (ref 84–241)
LEUKOCYTE ESTERASE UR-ACNC: ABNORMAL
LYMPHOCYTES # BLD AUTO: 1.31 K/UL — SIGNIFICANT CHANGE UP (ref 1–3.3)
LYMPHOCYTES # BLD AUTO: 14.3 % — SIGNIFICANT CHANGE UP (ref 13–44)
MCHC RBC-ENTMCNC: 29.3 PG — SIGNIFICANT CHANGE UP (ref 27–34)
MCHC RBC-ENTMCNC: 34.3 GM/DL — SIGNIFICANT CHANGE UP (ref 32–36)
MCV RBC AUTO: 85.4 FL — SIGNIFICANT CHANGE UP (ref 80–100)
MONOCYTES # BLD AUTO: 0.6 K/UL — SIGNIFICANT CHANGE UP (ref 0–0.9)
MONOCYTES NFR BLD AUTO: 6.5 % — SIGNIFICANT CHANGE UP (ref 2–14)
NEUTROPHILS # BLD AUTO: 7.12 K/UL — SIGNIFICANT CHANGE UP (ref 1.8–7.4)
NEUTROPHILS NFR BLD AUTO: 77.7 % — HIGH (ref 43–77)
NITRITE UR-MCNC: NEGATIVE — SIGNIFICANT CHANGE UP
PH UR: 5 — SIGNIFICANT CHANGE UP (ref 5–8)
PLATELET # BLD AUTO: 163 K/UL — SIGNIFICANT CHANGE UP (ref 150–400)
POTASSIUM SERPL-MCNC: 3.5 MMOL/L — SIGNIFICANT CHANGE UP (ref 3.5–5.3)
POTASSIUM SERPL-SCNC: 3.5 MMOL/L — SIGNIFICANT CHANGE UP (ref 3.5–5.3)
PROT ?TM UR-MCNC: 44 MG/DL — HIGH (ref 0–12)
PROT SERPL-MCNC: 6.8 GM/DL — SIGNIFICANT CHANGE UP (ref 6–8.3)
PROT UR-MCNC: 15
PROT/CREAT UR-RTO: 0.2 RATIO — SIGNIFICANT CHANGE UP (ref 0–0.2)
PROTHROM AB SERPL-ACNC: 11.1 SEC — SIGNIFICANT CHANGE UP (ref 10.5–13.4)
RBC # BLD: 4.1 M/UL — SIGNIFICANT CHANGE UP (ref 3.8–5.2)
RBC # FLD: 14.5 % — SIGNIFICANT CHANGE UP (ref 10.3–14.5)
SARS-COV-2 IGG+IGM SERPL QL IA: >250 U/ML — HIGH
SARS-COV-2 IGG+IGM SERPL QL IA: POSITIVE
SARS-COV-2 RNA SPEC QL NAA+PROBE: SIGNIFICANT CHANGE UP
SODIUM SERPL-SCNC: 136 MMOL/L — SIGNIFICANT CHANGE UP (ref 135–145)
SP GR SPEC: 1.02 — SIGNIFICANT CHANGE UP (ref 1.01–1.02)
T PALLIDUM AB TITR SER: NEGATIVE — SIGNIFICANT CHANGE UP
URATE SERPL-MCNC: 6.3 MG/DL — SIGNIFICANT CHANGE UP (ref 2.5–7)
UROBILINOGEN FLD QL: NEGATIVE — SIGNIFICANT CHANGE UP
WBC # BLD: 9.18 K/UL — SIGNIFICANT CHANGE UP (ref 3.8–10.5)
WBC # FLD AUTO: 9.18 K/UL — SIGNIFICANT CHANGE UP (ref 3.8–10.5)

## 2023-01-30 PROCEDURE — 83615 LACTATE (LD) (LDH) ENZYME: CPT

## 2023-01-30 PROCEDURE — 85025 COMPLETE CBC W/AUTO DIFF WBC: CPT

## 2023-01-30 PROCEDURE — 85730 THROMBOPLASTIN TIME PARTIAL: CPT

## 2023-01-30 PROCEDURE — 86780 TREPONEMA PALLIDUM: CPT

## 2023-01-30 PROCEDURE — 82570 ASSAY OF URINE CREATININE: CPT

## 2023-01-30 PROCEDURE — 94760 N-INVAS EAR/PLS OXIMETRY 1: CPT

## 2023-01-30 PROCEDURE — 88307 TISSUE EXAM BY PATHOLOGIST: CPT | Mod: 26

## 2023-01-30 PROCEDURE — 86769 SARS-COV-2 COVID-19 ANTIBODY: CPT

## 2023-01-30 PROCEDURE — 88307 TISSUE EXAM BY PATHOLOGIST: CPT

## 2023-01-30 PROCEDURE — U0005: CPT

## 2023-01-30 PROCEDURE — 71275 CT ANGIOGRAPHY CHEST: CPT

## 2023-01-30 PROCEDURE — 81001 URINALYSIS AUTO W/SCOPE: CPT

## 2023-01-30 PROCEDURE — 85610 PROTHROMBIN TIME: CPT

## 2023-01-30 PROCEDURE — 59050 FETAL MONITOR W/REPORT: CPT

## 2023-01-30 PROCEDURE — 80053 COMPREHEN METABOLIC PANEL: CPT

## 2023-01-30 PROCEDURE — 84550 ASSAY OF BLOOD/URIC ACID: CPT

## 2023-01-30 PROCEDURE — 85027 COMPLETE CBC AUTOMATED: CPT

## 2023-01-30 PROCEDURE — 36415 COLL VENOUS BLD VENIPUNCTURE: CPT

## 2023-01-30 PROCEDURE — 93005 ELECTROCARDIOGRAM TRACING: CPT

## 2023-01-30 PROCEDURE — U0003: CPT

## 2023-01-30 PROCEDURE — 84156 ASSAY OF PROTEIN URINE: CPT

## 2023-01-30 PROCEDURE — 85384 FIBRINOGEN ACTIVITY: CPT

## 2023-01-30 PROCEDURE — 86900 BLOOD TYPING SEROLOGIC ABO: CPT

## 2023-01-30 PROCEDURE — 86850 RBC ANTIBODY SCREEN: CPT

## 2023-01-30 PROCEDURE — C1889: CPT

## 2023-01-30 PROCEDURE — 86901 BLOOD TYPING SEROLOGIC RH(D): CPT

## 2023-01-30 RX ORDER — AMPICILLIN TRIHYDRATE 250 MG
2 CAPSULE ORAL ONCE
Refills: 0 | Status: COMPLETED | OUTPATIENT
Start: 2023-01-30 | End: 2023-01-30

## 2023-01-30 RX ORDER — MAGNESIUM HYDROXIDE 400 MG/1
30 TABLET, CHEWABLE ORAL
Refills: 0 | Status: DISCONTINUED | OUTPATIENT
Start: 2023-01-30 | End: 2023-02-02

## 2023-01-30 RX ORDER — PRAMOXINE HYDROCHLORIDE 150 MG/15G
1 AEROSOL, FOAM RECTAL EVERY 4 HOURS
Refills: 0 | Status: DISCONTINUED | OUTPATIENT
Start: 2023-01-30 | End: 2023-02-02

## 2023-01-30 RX ORDER — GENTAMICIN SULFATE 40 MG/ML
320 VIAL (ML) INJECTION ONCE
Refills: 0 | Status: COMPLETED | OUTPATIENT
Start: 2023-01-30 | End: 2023-01-30

## 2023-01-30 RX ORDER — OXYCODONE HYDROCHLORIDE 5 MG/1
5 TABLET ORAL ONCE
Refills: 0 | Status: DISCONTINUED | OUTPATIENT
Start: 2023-01-30 | End: 2023-02-02

## 2023-01-30 RX ORDER — ACETAMINOPHEN 500 MG
975 TABLET ORAL
Refills: 0 | Status: DISCONTINUED | OUTPATIENT
Start: 2023-01-30 | End: 2023-02-02

## 2023-01-30 RX ORDER — ACETAMINOPHEN 500 MG
1000 TABLET ORAL ONCE
Refills: 0 | Status: COMPLETED | OUTPATIENT
Start: 2023-01-30 | End: 2023-01-30

## 2023-01-30 RX ORDER — BENZOCAINE 10 %
1 GEL (GRAM) MUCOUS MEMBRANE EVERY 6 HOURS
Refills: 0 | Status: DISCONTINUED | OUTPATIENT
Start: 2023-01-30 | End: 2023-02-02

## 2023-01-30 RX ORDER — DIPHENHYDRAMINE HCL 50 MG
25 CAPSULE ORAL EVERY 6 HOURS
Refills: 0 | Status: DISCONTINUED | OUTPATIENT
Start: 2023-01-30 | End: 2023-02-02

## 2023-01-30 RX ORDER — SIMETHICONE 80 MG/1
80 TABLET, CHEWABLE ORAL EVERY 4 HOURS
Refills: 0 | Status: DISCONTINUED | OUTPATIENT
Start: 2023-01-30 | End: 2023-02-02

## 2023-01-30 RX ORDER — AER TRAVELER 0.5 G/1
1 SOLUTION RECTAL; TOPICAL EVERY 4 HOURS
Refills: 0 | Status: DISCONTINUED | OUTPATIENT
Start: 2023-01-30 | End: 2023-02-02

## 2023-01-30 RX ORDER — DIBUCAINE 1 %
1 OINTMENT (GRAM) RECTAL EVERY 6 HOURS
Refills: 0 | Status: DISCONTINUED | OUTPATIENT
Start: 2023-01-30 | End: 2023-02-02

## 2023-01-30 RX ORDER — SODIUM CHLORIDE 9 MG/ML
3 INJECTION INTRAMUSCULAR; INTRAVENOUS; SUBCUTANEOUS EVERY 8 HOURS
Refills: 0 | Status: DISCONTINUED | OUTPATIENT
Start: 2023-01-30 | End: 2023-02-02

## 2023-01-30 RX ORDER — KETOROLAC TROMETHAMINE 30 MG/ML
30 SYRINGE (ML) INJECTION ONCE
Refills: 0 | Status: DISCONTINUED | OUTPATIENT
Start: 2023-01-30 | End: 2023-01-30

## 2023-01-30 RX ORDER — IBUPROFEN 200 MG
600 TABLET ORAL EVERY 6 HOURS
Refills: 0 | Status: COMPLETED | OUTPATIENT
Start: 2023-01-30 | End: 2023-12-29

## 2023-01-30 RX ORDER — SODIUM CHLORIDE 9 MG/ML
1000 INJECTION, SOLUTION INTRAVENOUS
Refills: 0 | Status: DISCONTINUED | OUTPATIENT
Start: 2023-01-30 | End: 2023-01-30

## 2023-01-30 RX ORDER — LANOLIN
1 OINTMENT (GRAM) TOPICAL EVERY 6 HOURS
Refills: 0 | Status: DISCONTINUED | OUTPATIENT
Start: 2023-01-30 | End: 2023-02-02

## 2023-01-30 RX ORDER — HYDROCORTISONE 1 %
1 OINTMENT (GRAM) TOPICAL EVERY 6 HOURS
Refills: 0 | Status: DISCONTINUED | OUTPATIENT
Start: 2023-01-30 | End: 2023-02-02

## 2023-01-30 RX ORDER — OXYCODONE HYDROCHLORIDE 5 MG/1
5 TABLET ORAL
Refills: 0 | Status: DISCONTINUED | OUTPATIENT
Start: 2023-01-30 | End: 2023-02-02

## 2023-01-30 RX ORDER — CHLORHEXIDINE GLUCONATE 213 G/1000ML
1 SOLUTION TOPICAL ONCE
Refills: 0 | Status: DISCONTINUED | OUTPATIENT
Start: 2023-01-30 | End: 2023-01-30

## 2023-01-30 RX ORDER — CITRIC ACID/SODIUM CITRATE 300-500 MG
15 SOLUTION, ORAL ORAL EVERY 6 HOURS
Refills: 0 | Status: DISCONTINUED | OUTPATIENT
Start: 2023-01-30 | End: 2023-01-30

## 2023-01-30 RX ORDER — OXYTOCIN 10 UNIT/ML
41.67 VIAL (ML) INJECTION
Qty: 20 | Refills: 0 | Status: DISCONTINUED | OUTPATIENT
Start: 2023-01-30 | End: 2023-02-02

## 2023-01-30 RX ORDER — OXYTOCIN 10 UNIT/ML
333.33 VIAL (ML) INJECTION
Qty: 20 | Refills: 0 | Status: DISCONTINUED | OUTPATIENT
Start: 2023-01-30 | End: 2023-01-30

## 2023-01-30 RX ORDER — OXYTOCIN 10 UNIT/ML
2 VIAL (ML) INJECTION
Qty: 30 | Refills: 0 | Status: DISCONTINUED | OUTPATIENT
Start: 2023-01-30 | End: 2023-02-02

## 2023-01-30 RX ORDER — TETANUS TOXOID, REDUCED DIPHTHERIA TOXOID AND ACELLULAR PERTUSSIS VACCINE, ADSORBED 5; 2.5; 8; 8; 2.5 [IU]/.5ML; [IU]/.5ML; UG/.5ML; UG/.5ML; UG/.5ML
0.5 SUSPENSION INTRAMUSCULAR ONCE
Refills: 0 | Status: DISCONTINUED | OUTPATIENT
Start: 2023-01-30 | End: 2023-02-02

## 2023-01-30 RX ORDER — IBUPROFEN 200 MG
600 TABLET ORAL EVERY 6 HOURS
Refills: 0 | Status: DISCONTINUED | OUTPATIENT
Start: 2023-01-30 | End: 2023-02-02

## 2023-01-30 RX ADMIN — Medication 400 MILLIGRAM(S): at 19:38

## 2023-01-30 RX ADMIN — Medication 300 MILLIGRAM(S): at 20:56

## 2023-01-30 RX ADMIN — Medication 1000 MILLIGRAM(S): at 22:52

## 2023-01-30 RX ADMIN — Medication 2 MILLIUNIT(S)/MIN: at 14:15

## 2023-01-30 RX ADMIN — Medication 30 MILLIGRAM(S): at 22:52

## 2023-01-30 RX ADMIN — Medication 30 MILLIGRAM(S): at 22:27

## 2023-01-30 RX ADMIN — Medication 216 GRAM(S): at 19:38

## 2023-01-30 RX ADMIN — Medication 125 MILLIUNIT(S)/MIN: at 20:13

## 2023-01-30 RX ADMIN — SODIUM CHLORIDE 125 MILLILITER(S): 9 INJECTION, SOLUTION INTRAVENOUS at 09:58

## 2023-01-30 NOTE — PATIENT PROFILE OB - FALL HARM RISK - UNIVERSAL INTERVENTIONS
Bed in lowest position, wheels locked, appropriate side rails in place/Call bell, personal items and telephone in reach/Instruct patient to call for assistance before getting out of bed or chair/Non-slip footwear when patient is out of bed/[place] to call system/Physically safe environment - no spills, clutter or unnecessary equipment/Purposeful Proactive Rounding/Room/bathroom lighting operational, light cord in reach

## 2023-01-31 ENCOUNTER — TRANSCRIPTION ENCOUNTER (OUTPATIENT)
Age: 28
End: 2023-01-31

## 2023-01-31 LAB
ALBUMIN SERPL ELPH-MCNC: 2.4 G/DL — LOW (ref 3.3–5)
ALP SERPL-CCNC: 103 U/L — SIGNIFICANT CHANGE UP (ref 40–120)
ALT FLD-CCNC: 14 U/L — SIGNIFICANT CHANGE UP (ref 12–78)
ANION GAP SERPL CALC-SCNC: 7 MMOL/L — SIGNIFICANT CHANGE UP (ref 5–17)
AST SERPL-CCNC: 18 U/L — SIGNIFICANT CHANGE UP (ref 15–37)
BASOPHILS # BLD AUTO: 0.05 K/UL — SIGNIFICANT CHANGE UP (ref 0–0.2)
BASOPHILS NFR BLD AUTO: 0.4 % — SIGNIFICANT CHANGE UP (ref 0–2)
BILIRUB SERPL-MCNC: 0.4 MG/DL — SIGNIFICANT CHANGE UP (ref 0.2–1.2)
BUN SERPL-MCNC: 6 MG/DL — LOW (ref 7–23)
CALCIUM SERPL-MCNC: 9 MG/DL — SIGNIFICANT CHANGE UP (ref 8.5–10.1)
CHLORIDE SERPL-SCNC: 110 MMOL/L — HIGH (ref 96–108)
CO2 SERPL-SCNC: 22 MMOL/L — SIGNIFICANT CHANGE UP (ref 22–31)
CREAT SERPL-MCNC: 0.66 MG/DL — SIGNIFICANT CHANGE UP (ref 0.5–1.3)
EGFR: 122 ML/MIN/1.73M2 — SIGNIFICANT CHANGE UP
EOSINOPHIL # BLD AUTO: 0.04 K/UL — SIGNIFICANT CHANGE UP (ref 0–0.5)
EOSINOPHIL NFR BLD AUTO: 0.4 % — SIGNIFICANT CHANGE UP (ref 0–6)
GLUCOSE SERPL-MCNC: 78 MG/DL — SIGNIFICANT CHANGE UP (ref 70–99)
HCT VFR BLD CALC: 36 % — SIGNIFICANT CHANGE UP (ref 34.5–45)
HGB BLD-MCNC: 12.2 G/DL — SIGNIFICANT CHANGE UP (ref 11.5–15.5)
IMM GRANULOCYTES NFR BLD AUTO: 0.6 % — SIGNIFICANT CHANGE UP (ref 0–0.9)
LYMPHOCYTES # BLD AUTO: 1.26 K/UL — SIGNIFICANT CHANGE UP (ref 1–3.3)
LYMPHOCYTES # BLD AUTO: 11.3 % — LOW (ref 13–44)
MCHC RBC-ENTMCNC: 29.1 PG — SIGNIFICANT CHANGE UP (ref 27–34)
MCHC RBC-ENTMCNC: 33.9 GM/DL — SIGNIFICANT CHANGE UP (ref 32–36)
MCV RBC AUTO: 85.9 FL — SIGNIFICANT CHANGE UP (ref 80–100)
MONOCYTES # BLD AUTO: 0.74 K/UL — SIGNIFICANT CHANGE UP (ref 0–0.9)
MONOCYTES NFR BLD AUTO: 6.6 % — SIGNIFICANT CHANGE UP (ref 2–14)
NEUTROPHILS # BLD AUTO: 8.98 K/UL — HIGH (ref 1.8–7.4)
NEUTROPHILS NFR BLD AUTO: 80.7 % — HIGH (ref 43–77)
PLATELET # BLD AUTO: 169 K/UL — SIGNIFICANT CHANGE UP (ref 150–400)
POTASSIUM SERPL-MCNC: 3.9 MMOL/L — SIGNIFICANT CHANGE UP (ref 3.5–5.3)
POTASSIUM SERPL-SCNC: 3.9 MMOL/L — SIGNIFICANT CHANGE UP (ref 3.5–5.3)
PROT SERPL-MCNC: 6.1 GM/DL — SIGNIFICANT CHANGE UP (ref 6–8.3)
RBC # BLD: 4.19 M/UL — SIGNIFICANT CHANGE UP (ref 3.8–5.2)
RBC # FLD: 14.6 % — HIGH (ref 10.3–14.5)
SODIUM SERPL-SCNC: 139 MMOL/L — SIGNIFICANT CHANGE UP (ref 135–145)
WBC # BLD: 11.14 K/UL — HIGH (ref 3.8–10.5)
WBC # FLD AUTO: 11.14 K/UL — HIGH (ref 3.8–10.5)

## 2023-01-31 RX ADMIN — Medication 975 MILLIGRAM(S): at 21:11

## 2023-01-31 RX ADMIN — Medication 600 MILLIGRAM(S): at 23:56

## 2023-01-31 RX ADMIN — Medication 600 MILLIGRAM(S): at 11:48

## 2023-01-31 RX ADMIN — Medication 600 MILLIGRAM(S): at 12:44

## 2023-01-31 RX ADMIN — Medication 600 MILLIGRAM(S): at 06:17

## 2023-01-31 RX ADMIN — Medication 975 MILLIGRAM(S): at 22:07

## 2023-01-31 RX ADMIN — Medication 600 MILLIGRAM(S): at 07:00

## 2023-01-31 RX ADMIN — Medication 975 MILLIGRAM(S): at 02:58

## 2023-01-31 RX ADMIN — Medication 600 MILLIGRAM(S): at 18:13

## 2023-01-31 RX ADMIN — Medication 975 MILLIGRAM(S): at 09:11

## 2023-01-31 RX ADMIN — Medication 975 MILLIGRAM(S): at 14:40

## 2023-01-31 RX ADMIN — Medication 975 MILLIGRAM(S): at 10:00

## 2023-01-31 RX ADMIN — Medication 975 MILLIGRAM(S): at 03:50

## 2023-01-31 RX ADMIN — Medication 1 TABLET(S): at 09:10

## 2023-01-31 NOTE — PROGRESS NOTE ADULT - ASSESSMENT
s/p  PPD#1  -Doing well  -BPs stable  -AM labs pending  -Continue current postpartum care  -Analgesia prn

## 2023-01-31 NOTE — DISCHARGE NOTE OB - PLAN OF CARE
Patient underwent a normal spontaneous vaginal delivery. Post-partum course was uncomplicated. Pain is well controlled with PRN medication. She has no difficulty with ambulation, voiding, or PO intake. Lab values and vital signs are stable prior to discharge.

## 2023-01-31 NOTE — DISCHARGE NOTE OB - HOSPITAL COURSE
Pinky Pike 26yo F  at 37w GA underwent a normal spontaneous vaginal delivery. Clinical course complicated by chorioamnionitis Given amp/gent/ofirmev. Post-partum course was uncomplicated. Pain is well controlled with PRN medication. She has no difficulty with ambulation, voiding, or PO intake. Lab values and vital signs are stable prior to discharge.

## 2023-01-31 NOTE — DISCHARGE NOTE OB - MATERIALS PROVIDED
Vaccinations/Breastfeeding Log/Breastfeeding Mother’s Support Group Information/Guide to Postpartum Care/Back To Sleep Handout/Shaken Baby Prevention Handout/Breastfeeding Guide and Packet

## 2023-01-31 NOTE — DISCHARGE NOTE OB - PATIENT PORTAL LINK FT
You can access the FollowMyHealth Patient Portal offered by [facility] by registering at the following website: [email or web address]. By joining [facility]’s FollowMyHealth portal, you will also be able to view your health information using other applications (apps) compatible with our system.

## 2023-01-31 NOTE — DISCHARGE NOTE OB - CARE PLAN
1 Principal Discharge DX:	 (normal spontaneous vaginal delivery)  Assessment and plan of treatment:	Patient underwent a normal spontaneous vaginal delivery. Post-partum course was uncomplicated. Pain is well controlled with PRN medication. She has no difficulty with ambulation, voiding, or PO intake. Lab values and vital signs are stable prior to discharge.

## 2023-01-31 NOTE — DISCHARGE NOTE OB - MEDICATION SUMMARY - MEDICATIONS TO TAKE
I will START or STAY ON the medications listed below when I get home from the hospital:    ibuprofen 600 mg oral tablet  -- 1 tab(s) by mouth every 6 hours, As Needed  -- Indication: For  (normal spontaneous vaginal delivery)    acetaminophen 325 mg oral tablet  -- 3 tab(s) by mouth every 6 hours, As Needed  -- Indication: For  (normal spontaneous vaginal delivery)    Prenatal Multivitamins with Folic Acid 1 mg oral tablet  -- 1 tab(s) by mouth once a day  -- Indication: For  (normal spontaneous vaginal delivery)   I will START or STAY ON the medications listed below when I get home from the hospital:    ibuprofen 600 mg oral tablet  -- 1 tab(s) by mouth every 6 hours, As Needed  -- Indication: For  (normal spontaneous vaginal delivery)    acetaminophen 325 mg oral tablet  -- 3 tab(s) by mouth every 6 hours, As Needed  -- Indication: For  (normal spontaneous vaginal delivery)    labetalol 100 mg oral tablet  -- 1 tab(s) by mouth every 12 hours  -- Indication: For hypertension    Prenatal Multivitamins with Folic Acid 1 mg oral tablet  -- 1 tab(s) by mouth once a day  -- Indication: For  (normal spontaneous vaginal delivery)   I will START or STAY ON the medications listed below when I get home from the hospital:    ibuprofen 600 mg oral tablet  -- 1 tab(s) by mouth every 6 hours, As Needed  -- Indication: For  (normal spontaneous vaginal delivery)    acetaminophen 325 mg oral tablet  -- 3 tab(s) by mouth every 6 hours, As Needed  -- Indication: For  (normal spontaneous vaginal delivery)    labetalol 100 mg oral tablet  -- 1 tab(s) by mouth every 8 hours   -- It is very important that you take or use this exactly as directed.  Do not skip doses or discontinue unless directed by your doctor.  May cause drowsiness or dizziness.  Some non-prescription drugs may aggravate your condition.  Read all labels carefully.  If a warning appears, check with your doctor before taking.    -- Indication: For blood pressure    Prenatal Multivitamins with Folic Acid 1 mg oral tablet  -- 1 tab(s) by mouth once a day  -- Indication: For  (normal spontaneous vaginal delivery)

## 2023-02-01 PROCEDURE — 93010 ELECTROCARDIOGRAM REPORT: CPT

## 2023-02-01 PROCEDURE — 71275 CT ANGIOGRAPHY CHEST: CPT | Mod: 26

## 2023-02-01 RX ORDER — FAMOTIDINE 10 MG/ML
20 INJECTION INTRAVENOUS ONCE
Refills: 0 | Status: COMPLETED | OUTPATIENT
Start: 2023-02-01 | End: 2023-02-01

## 2023-02-01 RX ADMIN — Medication 975 MILLIGRAM(S): at 08:17

## 2023-02-01 RX ADMIN — Medication 600 MILLIGRAM(S): at 05:58

## 2023-02-01 RX ADMIN — Medication 1 TABLET(S): at 08:16

## 2023-02-01 RX ADMIN — Medication 600 MILLIGRAM(S): at 00:50

## 2023-02-01 RX ADMIN — Medication 975 MILLIGRAM(S): at 16:00

## 2023-02-01 RX ADMIN — Medication 975 MILLIGRAM(S): at 09:15

## 2023-02-01 RX ADMIN — Medication 975 MILLIGRAM(S): at 22:04

## 2023-02-01 RX ADMIN — Medication 600 MILLIGRAM(S): at 06:50

## 2023-02-01 RX ADMIN — Medication 600 MILLIGRAM(S): at 17:27

## 2023-02-01 RX ADMIN — Medication 600 MILLIGRAM(S): at 11:36

## 2023-02-01 RX ADMIN — Medication 975 MILLIGRAM(S): at 14:43

## 2023-02-01 RX ADMIN — Medication 600 MILLIGRAM(S): at 18:30

## 2023-02-01 RX ADMIN — Medication 975 MILLIGRAM(S): at 21:04

## 2023-02-01 RX ADMIN — FAMOTIDINE 20 MILLIGRAM(S): 10 INJECTION INTRAVENOUS at 14:43

## 2023-02-01 RX ADMIN — Medication 600 MILLIGRAM(S): at 12:15

## 2023-02-01 NOTE — CHART NOTE - NSCHARTNOTEFT_GEN_A_CORE
RN informed MD that patient experienced chest pain. MD evaluated patient while in the nursery. Patient reports chest pain felt different from her regular heartburn, and was a sharp pain located in her sternal area and under her breast. Resolved within 5 minutes after some milk. No radiation. No SOB, no pleuritic pain, no N/V, dizziness. Stable. STAT EKG NSR. Continue to monitor. RN informed MD that patient experienced chest pain. MD evaluated patient while in the nursery. Patient reports chest pain felt different from her regular heartburn, and was a sharp pain located in her sternal area and under her breast. Resolved within 5 minutes after some milk. No radiation. No SOB, no pleuritic pain, no N/V, dizziness. Stable. STAT EKG NSR. CT chest ordered at request of Dr. Mckeon. Continue to monitor.

## 2023-02-02 VITALS — DIASTOLIC BLOOD PRESSURE: 81 MMHG | HEART RATE: 61 BPM | SYSTOLIC BLOOD PRESSURE: 146 MMHG

## 2023-02-02 LAB
ALBUMIN SERPL ELPH-MCNC: 2.7 G/DL — LOW (ref 3.3–5)
ALP SERPL-CCNC: 94 U/L — SIGNIFICANT CHANGE UP (ref 40–120)
ALT FLD-CCNC: 22 U/L — SIGNIFICANT CHANGE UP (ref 12–78)
ANION GAP SERPL CALC-SCNC: 6 MMOL/L — SIGNIFICANT CHANGE UP (ref 5–17)
AST SERPL-CCNC: 26 U/L — SIGNIFICANT CHANGE UP (ref 15–37)
BILIRUB SERPL-MCNC: 0.3 MG/DL — SIGNIFICANT CHANGE UP (ref 0.2–1.2)
BUN SERPL-MCNC: 8 MG/DL — SIGNIFICANT CHANGE UP (ref 7–23)
CALCIUM SERPL-MCNC: 9.1 MG/DL — SIGNIFICANT CHANGE UP (ref 8.5–10.1)
CHLORIDE SERPL-SCNC: 107 MMOL/L — SIGNIFICANT CHANGE UP (ref 96–108)
CO2 SERPL-SCNC: 25 MMOL/L — SIGNIFICANT CHANGE UP (ref 22–31)
CREAT SERPL-MCNC: 0.64 MG/DL — SIGNIFICANT CHANGE UP (ref 0.5–1.3)
EGFR: 123 ML/MIN/1.73M2 — SIGNIFICANT CHANGE UP
GLUCOSE SERPL-MCNC: 85 MG/DL — SIGNIFICANT CHANGE UP (ref 70–99)
HCT VFR BLD CALC: 37.4 % — SIGNIFICANT CHANGE UP (ref 34.5–45)
HGB BLD-MCNC: 12.5 G/DL — SIGNIFICANT CHANGE UP (ref 11.5–15.5)
MCHC RBC-ENTMCNC: 29.3 PG — SIGNIFICANT CHANGE UP (ref 27–34)
MCHC RBC-ENTMCNC: 33.4 GM/DL — SIGNIFICANT CHANGE UP (ref 32–36)
MCV RBC AUTO: 87.8 FL — SIGNIFICANT CHANGE UP (ref 80–100)
PLATELET # BLD AUTO: 166 K/UL — SIGNIFICANT CHANGE UP (ref 150–400)
POTASSIUM SERPL-MCNC: 3.7 MMOL/L — SIGNIFICANT CHANGE UP (ref 3.5–5.3)
POTASSIUM SERPL-SCNC: 3.7 MMOL/L — SIGNIFICANT CHANGE UP (ref 3.5–5.3)
PROT SERPL-MCNC: 7 GM/DL — SIGNIFICANT CHANGE UP (ref 6–8.3)
RBC # BLD: 4.26 M/UL — SIGNIFICANT CHANGE UP (ref 3.8–5.2)
RBC # FLD: 14.6 % — HIGH (ref 10.3–14.5)
SODIUM SERPL-SCNC: 138 MMOL/L — SIGNIFICANT CHANGE UP (ref 135–145)
WBC # BLD: 8.92 K/UL — SIGNIFICANT CHANGE UP (ref 3.8–10.5)
WBC # FLD AUTO: 8.92 K/UL — SIGNIFICANT CHANGE UP (ref 3.8–10.5)

## 2023-02-02 RX ORDER — LABETALOL HCL 100 MG
100 TABLET ORAL EVERY 12 HOURS
Refills: 0 | Status: DISCONTINUED | OUTPATIENT
Start: 2023-02-02 | End: 2023-02-02

## 2023-02-02 RX ORDER — LABETALOL HCL 100 MG
100 TABLET ORAL THREE TIMES A DAY
Refills: 0 | Status: DISCONTINUED | OUTPATIENT
Start: 2023-02-02 | End: 2023-02-02

## 2023-02-02 RX ORDER — LABETALOL HCL 100 MG
1 TABLET ORAL
Qty: 0 | Refills: 0 | DISCHARGE
Start: 2023-02-02

## 2023-02-02 RX ORDER — LABETALOL HCL 100 MG
1 TABLET ORAL
Qty: 42 | Refills: 0
Start: 2023-02-02 | End: 2023-02-15

## 2023-02-02 RX ADMIN — Medication 600 MILLIGRAM(S): at 06:07

## 2023-02-02 RX ADMIN — Medication 100 MILLIGRAM(S): at 18:19

## 2023-02-02 RX ADMIN — Medication 600 MILLIGRAM(S): at 00:59

## 2023-02-02 RX ADMIN — Medication 600 MILLIGRAM(S): at 01:45

## 2023-02-02 RX ADMIN — Medication 600 MILLIGRAM(S): at 13:02

## 2023-02-02 RX ADMIN — Medication 100 MILLIGRAM(S): at 10:48

## 2023-02-02 RX ADMIN — Medication 600 MILLIGRAM(S): at 14:02

## 2023-02-02 RX ADMIN — Medication 975 MILLIGRAM(S): at 09:18

## 2023-02-02 RX ADMIN — Medication 1 TABLET(S): at 09:18

## 2023-02-02 RX ADMIN — Medication 600 MILLIGRAM(S): at 18:19

## 2023-02-02 NOTE — PROGRESS NOTE ADULT - SUBJECTIVE AND OBJECTIVE BOX
S: Patient doing well. Chest discomfort resolved. Minimal lochia. Pain controlled.    O: Vital Signs Last 24 Hrs  T(C): 36.9 (2023 12:04), Max: 37.1 (2023 18:45)  T(F): 98.5 (2023 12:04), Max: 98.8 (2023 18:45)  HR: 71 (2023 12:04) (65 - 76)  BP: 135/69 (2023 12:04) (131/76 - 151/95)  BP(mean): --  RR: 17 (2023 12:04) (17 - 18)  SpO2: 99% (2023 12:04) (95% - 99%)    Parameters below as of 2023 12:04  Patient On (Oxygen Delivery Method): room air        Gen: NAD  Abd: soft, NT, ND, fundus firm below umbilicus  Lochia: moderate  Ext: no tenderness    Labs:                        12.5   8.92  )-----------( 166      ( 2023 07:25 )             37.4       A: 27y PPD#3 s/p  doing well.  PE was ruled out. Suspect pain was in chest was musculoskeletal, with anxiety component.   Mild hypertension.     Plan: Will start labetalol 100 mg BID and if BP remains mild range, for discharge home this afternoon. 
S: Patient seen in NICU. Crying and emotional. Reports she has been feeling chest tightening and heaviness that comes and goes. Notified nurse this morning and a STAT EKG was done. Normal per patient. She thinks  maybe she is having some anxiety or panic attacks. Does not feel ready to go home today. Minimal support at home due to her 's job; he is in the [employer] and was only given one day off for the birth.  Patient also has a toddler to care for as well. Denies vaginal pain. Using Tylenol Motrin. Small lochia.      O: Vital Signs Last 24 Hrs  T(C): 36.7 (2023 08:28), Max: 36.7 (2023 00:00)  T(F): 98.1 (2023 08:28), Max: 98.1 (2023 08:28)  HR: 91 (2023 08:28) (72 - 91)  BP: 133/83 (2023 08:28) (130/76 - 140/88)  BP(mean): --  RR: 17 (2023 08:28) (17 - 18)  SpO2: 99% (2023 08:28) (97% - 100%)    Parameters below as of 2023 08:28  Patient On (Oxygen Delivery Method): room air        Gen: NAD  Lungs- Normal effort, No SOB  breasts soft, nipples intact  Abd: soft, NT, ND, fundus firm below umbilicus  Lochia- rubra  Ext: no tenderness    Labs:                        12.2   11.14 )-----------( 169      ( 2023 10:37 )             36.0        EKG- normal       A: 27y PPD# 2 s/p    Chest pain/heaviness         Plan:  Stat CT Scan    breast feeding- encouraged hand expression and pumping  Encouraged PO fluids  Encouraged ambulation  circumcision delayed - baby in NICU  advance care  Delay discharge       Alexsandra Chavez CNM
  27y  Female PPD#1  .   S: Patient is doing well and has no acute complaints. Pain is well controlled with medications. Patient is tolerating regular diet. She is OOB and urinating w/o any difficulties.  Patient is breast  feeding.     O: Vital Signs Last 24 Hrs  T(C): 36.5 (2023 04:00), Max: 36.9 (2023 20:55)  T(F): 97.7 (2023 04:00), Max: 98.5 (2023 20:55)  HR: 71 (2023 04:00) (71 - 96)  BP: 114/70 (2023 04:00) (114/70 - 152/87)  BP(mean): 99 (2023 21:55) (88 - 100)  RR: 18 (2023 04:00) (17 - 20)  SpO2: 100% (2023 04:00) (98% - 100%)    Parameters below as of 2023 04:00  Patient On (Oxygen Delivery Method): room air      Appears well, resting in bed  Abdo: Fundus firm w/o tenderness   PV: No calf tenderness, edema.     Labs:                         12.0   9.18  )-----------( 163      ( 2023 09:28 )             35.0

## 2023-02-03 ENCOUNTER — NON-APPOINTMENT (OUTPATIENT)
Age: 28
End: 2023-02-03

## 2023-02-04 ENCOUNTER — INPATIENT (INPATIENT)
Facility: HOSPITAL | Age: 28
LOS: 1 days | Discharge: HOME | DRG: 776 | End: 2023-02-06
Attending: OBSTETRICS & GYNECOLOGY | Admitting: OBSTETRICS & GYNECOLOGY
Payer: COMMERCIAL

## 2023-02-04 ENCOUNTER — EMERGENCY (EMERGENCY)
Facility: HOSPITAL | Age: 28
LOS: 0 days | Discharge: HOME | End: 2023-02-04
Payer: COMMERCIAL

## 2023-02-04 VITALS — HEIGHT: 62 IN | WEIGHT: 190.04 LBS

## 2023-02-04 VITALS
SYSTOLIC BLOOD PRESSURE: 184 MMHG | DIASTOLIC BLOOD PRESSURE: 88 MMHG | RESPIRATION RATE: 17 BRPM | TEMPERATURE: 98 F | OXYGEN SATURATION: 99 % | HEART RATE: 69 BPM

## 2023-02-04 VITALS
RESPIRATION RATE: 16 BRPM | DIASTOLIC BLOOD PRESSURE: 97 MMHG | HEART RATE: 65 BPM | SYSTOLIC BLOOD PRESSURE: 185 MMHG | TEMPERATURE: 99 F | OXYGEN SATURATION: 99 %

## 2023-02-04 DIAGNOSIS — O26.899 OTHER SPECIFIED PREGNANCY RELATED CONDITIONS, UNSPECIFIED TRIMESTER: ICD-10-CM

## 2023-02-04 DIAGNOSIS — I10 ESSENTIAL (PRIMARY) HYPERTENSION: ICD-10-CM

## 2023-02-04 DIAGNOSIS — R07.9 CHEST PAIN, UNSPECIFIED: ICD-10-CM

## 2023-02-04 DIAGNOSIS — N64.4 MASTODYNIA: ICD-10-CM

## 2023-02-04 DIAGNOSIS — Z3A.00 WEEKS OF GESTATION OF PREGNANCY NOT SPECIFIED: ICD-10-CM

## 2023-02-04 LAB
ALBUMIN SERPL ELPH-MCNC: 3 G/DL — LOW (ref 3.3–5)
ALP SERPL-CCNC: 89 U/L — SIGNIFICANT CHANGE UP (ref 40–120)
ALT FLD-CCNC: 32 U/L — SIGNIFICANT CHANGE UP (ref 12–78)
ANION GAP SERPL CALC-SCNC: 7 MMOL/L — SIGNIFICANT CHANGE UP (ref 5–17)
APTT BLD: 32 SEC — SIGNIFICANT CHANGE UP (ref 27.5–35.5)
AST SERPL-CCNC: 25 U/L — SIGNIFICANT CHANGE UP (ref 15–37)
BASOPHILS # BLD AUTO: 0.04 K/UL — SIGNIFICANT CHANGE UP (ref 0–0.2)
BASOPHILS NFR BLD AUTO: 0.5 % — SIGNIFICANT CHANGE UP (ref 0–2)
BILIRUB SERPL-MCNC: 0.3 MG/DL — SIGNIFICANT CHANGE UP (ref 0.2–1.2)
BUN SERPL-MCNC: 9 MG/DL — SIGNIFICANT CHANGE UP (ref 7–23)
CALCIUM SERPL-MCNC: 9 MG/DL — SIGNIFICANT CHANGE UP (ref 8.5–10.1)
CHLORIDE SERPL-SCNC: 108 MMOL/L — SIGNIFICANT CHANGE UP (ref 96–108)
CO2 SERPL-SCNC: 24 MMOL/L — SIGNIFICANT CHANGE UP (ref 22–31)
CREAT SERPL-MCNC: 0.59 MG/DL — SIGNIFICANT CHANGE UP (ref 0.5–1.3)
EGFR: 125 ML/MIN/1.73M2 — SIGNIFICANT CHANGE UP
EOSINOPHIL # BLD AUTO: 0.13 K/UL — SIGNIFICANT CHANGE UP (ref 0–0.5)
EOSINOPHIL NFR BLD AUTO: 1.7 % — SIGNIFICANT CHANGE UP (ref 0–6)
FIBRINOGEN PPP-MCNC: 752 MG/DL — HIGH (ref 330–520)
GLUCOSE SERPL-MCNC: 85 MG/DL — SIGNIFICANT CHANGE UP (ref 70–99)
HCT VFR BLD CALC: 36.7 % — SIGNIFICANT CHANGE UP (ref 34.5–45)
HGB BLD-MCNC: 12.6 G/DL — SIGNIFICANT CHANGE UP (ref 11.5–15.5)
IMM GRANULOCYTES NFR BLD AUTO: 0.4 % — SIGNIFICANT CHANGE UP (ref 0–0.9)
INR BLD: 0.95 RATIO — SIGNIFICANT CHANGE UP (ref 0.88–1.16)
LDH SERPL L TO P-CCNC: 215 U/L — SIGNIFICANT CHANGE UP (ref 84–241)
LYMPHOCYTES # BLD AUTO: 1.36 K/UL — SIGNIFICANT CHANGE UP (ref 1–3.3)
LYMPHOCYTES # BLD AUTO: 17.6 % — SIGNIFICANT CHANGE UP (ref 13–44)
MAGNESIUM SERPL-MCNC: 6 MG/DL — HIGH (ref 1.6–2.6)
MCHC RBC-ENTMCNC: 29.3 PG — SIGNIFICANT CHANGE UP (ref 27–34)
MCHC RBC-ENTMCNC: 34.3 GM/DL — SIGNIFICANT CHANGE UP (ref 32–36)
MCV RBC AUTO: 85.3 FL — SIGNIFICANT CHANGE UP (ref 80–100)
MONOCYTES # BLD AUTO: 0.39 K/UL — SIGNIFICANT CHANGE UP (ref 0–0.9)
MONOCYTES NFR BLD AUTO: 5 % — SIGNIFICANT CHANGE UP (ref 2–14)
NEUTROPHILS # BLD AUTO: 5.79 K/UL — SIGNIFICANT CHANGE UP (ref 1.8–7.4)
NEUTROPHILS NFR BLD AUTO: 74.8 % — SIGNIFICANT CHANGE UP (ref 43–77)
NT-PROBNP SERPL-SCNC: 282 PG/ML — HIGH (ref 0–125)
PLATELET # BLD AUTO: 204 K/UL — SIGNIFICANT CHANGE UP (ref 150–400)
POTASSIUM SERPL-MCNC: 3.6 MMOL/L — SIGNIFICANT CHANGE UP (ref 3.5–5.3)
POTASSIUM SERPL-SCNC: 3.6 MMOL/L — SIGNIFICANT CHANGE UP (ref 3.5–5.3)
PROT SERPL-MCNC: 7.1 GM/DL — SIGNIFICANT CHANGE UP (ref 6–8.3)
PROTHROM AB SERPL-ACNC: 11 SEC — SIGNIFICANT CHANGE UP (ref 10.5–13.4)
RBC # BLD: 4.3 M/UL — SIGNIFICANT CHANGE UP (ref 3.8–5.2)
RBC # FLD: 14.1 % — SIGNIFICANT CHANGE UP (ref 10.3–14.5)
SARS-COV-2 RNA SPEC QL NAA+PROBE: SIGNIFICANT CHANGE UP
SODIUM SERPL-SCNC: 139 MMOL/L — SIGNIFICANT CHANGE UP (ref 135–145)
TROPONIN I, HIGH SENSITIVITY RESULT: 4.19 NG/L — SIGNIFICANT CHANGE UP
URATE SERPL-MCNC: 6.1 MG/DL — SIGNIFICANT CHANGE UP (ref 2.5–7)
WBC # BLD: 7.74 K/UL — SIGNIFICANT CHANGE UP (ref 3.8–10.5)
WBC # FLD AUTO: 7.74 K/UL — SIGNIFICANT CHANGE UP (ref 3.8–10.5)

## 2023-02-04 PROCEDURE — 86900 BLOOD TYPING SEROLOGIC ABO: CPT

## 2023-02-04 PROCEDURE — 85384 FIBRINOGEN ACTIVITY: CPT

## 2023-02-04 PROCEDURE — 85025 COMPLETE CBC W/AUTO DIFF WBC: CPT

## 2023-02-04 PROCEDURE — 83880 ASSAY OF NATRIURETIC PEPTIDE: CPT

## 2023-02-04 PROCEDURE — 36415 COLL VENOUS BLD VENIPUNCTURE: CPT

## 2023-02-04 PROCEDURE — 83735 ASSAY OF MAGNESIUM: CPT

## 2023-02-04 PROCEDURE — 99223 1ST HOSP IP/OBS HIGH 75: CPT

## 2023-02-04 PROCEDURE — 87635 SARS-COV-2 COVID-19 AMP PRB: CPT

## 2023-02-04 PROCEDURE — 83615 LACTATE (LD) (LDH) ENZYME: CPT

## 2023-02-04 PROCEDURE — 84484 ASSAY OF TROPONIN QUANT: CPT

## 2023-02-04 PROCEDURE — 85027 COMPLETE CBC AUTOMATED: CPT

## 2023-02-04 PROCEDURE — 99285 EMERGENCY DEPT VISIT HI MDM: CPT

## 2023-02-04 PROCEDURE — 80053 COMPREHEN METABOLIC PANEL: CPT

## 2023-02-04 PROCEDURE — 85730 THROMBOPLASTIN TIME PARTIAL: CPT

## 2023-02-04 PROCEDURE — 86850 RBC ANTIBODY SCREEN: CPT

## 2023-02-04 PROCEDURE — 71045 X-RAY EXAM CHEST 1 VIEW: CPT | Mod: 26

## 2023-02-04 PROCEDURE — 85610 PROTHROMBIN TIME: CPT

## 2023-02-04 PROCEDURE — 99214 OFFICE O/P EST MOD 30 MIN: CPT

## 2023-02-04 PROCEDURE — 93010 ELECTROCARDIOGRAM REPORT: CPT

## 2023-02-04 PROCEDURE — 84550 ASSAY OF BLOOD/URIC ACID: CPT

## 2023-02-04 PROCEDURE — 99222 1ST HOSP IP/OBS MODERATE 55: CPT | Mod: GC

## 2023-02-04 PROCEDURE — 86901 BLOOD TYPING SEROLOGIC RH(D): CPT

## 2023-02-04 RX ORDER — KETOROLAC TROMETHAMINE 30 MG/ML
30 SYRINGE (ML) INJECTION EVERY 6 HOURS
Refills: 0 | Status: DISCONTINUED | OUTPATIENT
Start: 2023-02-04 | End: 2023-02-05

## 2023-02-04 RX ORDER — HYDRALAZINE HCL 50 MG
10 TABLET ORAL ONCE
Refills: 0 | Status: COMPLETED | OUTPATIENT
Start: 2023-02-04 | End: 2023-02-04

## 2023-02-04 RX ORDER — HYDRALAZINE HCL 50 MG
5 TABLET ORAL ONCE
Refills: 0 | Status: DISCONTINUED | OUTPATIENT
Start: 2023-02-04 | End: 2023-02-04

## 2023-02-04 RX ORDER — MAGNESIUM SULFATE 500 MG/ML
4 VIAL (ML) INJECTION ONCE
Refills: 0 | Status: DISCONTINUED | OUTPATIENT
Start: 2023-02-04 | End: 2023-02-04

## 2023-02-04 RX ORDER — NIFEDIPINE 30 MG
30 TABLET, EXTENDED RELEASE 24 HR ORAL EVERY 24 HOURS
Refills: 0 | Status: DISCONTINUED | OUTPATIENT
Start: 2023-02-04 | End: 2023-02-06

## 2023-02-04 RX ORDER — MAGNESIUM SULFATE 500 MG/ML
2 VIAL (ML) INJECTION
Qty: 40 | Refills: 0 | Status: DISCONTINUED | OUTPATIENT
Start: 2023-02-04 | End: 2023-02-04

## 2023-02-04 RX ORDER — ACETAMINOPHEN 500 MG
975 TABLET ORAL
Refills: 0 | Status: DISCONTINUED | OUTPATIENT
Start: 2023-02-04 | End: 2023-02-06

## 2023-02-04 RX ORDER — LABETALOL HCL 100 MG
200 TABLET ORAL
Refills: 0 | Status: DISCONTINUED | OUTPATIENT
Start: 2023-02-04 | End: 2023-02-06

## 2023-02-04 RX ORDER — HYDRALAZINE HCL 50 MG
5 TABLET ORAL ONCE
Refills: 0 | Status: COMPLETED | OUTPATIENT
Start: 2023-02-04 | End: 2023-02-04

## 2023-02-04 RX ORDER — CEPHALEXIN 500 MG
500 CAPSULE ORAL
Refills: 0 | Status: DISCONTINUED | OUTPATIENT
Start: 2023-02-04 | End: 2023-02-05

## 2023-02-04 RX ORDER — MAGNESIUM SULFATE 500 MG/ML
4 VIAL (ML) INJECTION ONCE
Refills: 0 | Status: COMPLETED | OUTPATIENT
Start: 2023-02-04 | End: 2023-02-04

## 2023-02-04 RX ORDER — SODIUM CHLORIDE 9 MG/ML
1000 INJECTION, SOLUTION INTRAVENOUS
Refills: 0 | Status: DISCONTINUED | OUTPATIENT
Start: 2023-02-04 | End: 2023-02-06

## 2023-02-04 RX ADMIN — Medication 200 MILLIGRAM(S): at 17:39

## 2023-02-04 RX ADMIN — Medication 50 GM/HR: at 16:11

## 2023-02-04 RX ADMIN — Medication 500 MILLIGRAM(S): at 23:15

## 2023-02-04 RX ADMIN — Medication 30 MILLIGRAM(S): at 17:46

## 2023-02-04 RX ADMIN — Medication 975 MILLIGRAM(S): at 21:12

## 2023-02-04 RX ADMIN — Medication 30 MILLIGRAM(S): at 16:54

## 2023-02-04 RX ADMIN — Medication 30 MILLIGRAM(S): at 18:01

## 2023-02-04 RX ADMIN — Medication 10 MILLIGRAM(S): at 17:00

## 2023-02-04 RX ADMIN — Medication 500 MILLIGRAM(S): at 17:00

## 2023-02-04 RX ADMIN — Medication 300 GRAM(S): at 15:47

## 2023-02-04 RX ADMIN — Medication 10 MILLIGRAM(S): at 16:30

## 2023-02-04 RX ADMIN — SODIUM CHLORIDE 75 MILLILITER(S): 9 INJECTION, SOLUTION INTRAVENOUS at 17:14

## 2023-02-04 RX ADMIN — Medication 5 MILLIGRAM(S): at 15:42

## 2023-02-04 NOTE — H&P ADULT - ASSESSMENT
Pt is a  PPD# 5 s/p  @37weeks readmit for elevated BPs, chest pain, and breast pain. Admitted for:       #Preeclampsia   - admit to postpartum   - s/p  PPD#5   - Severe range BPs postpartum (184/88) while on labetalol 100 TID  - hydralazine 5mg IV push   - will start Magnesium for 24 hours    - f/u CMP, CBC, preeclamptic labs    #Chest pain   - non radiating, sharp positional pain   - EKG shows NSR with "possible left atrial enlargement"   - r/o postpartum cardiomyopathy   - f/u BNP, trops   - chest xray   - cardiology consult     #Mastitis   - breast engorged and erythematous after discontinuation of pumping   - Afebrile, subjective chills   - continue to massage breast for symptom relief   - cold compress   - avoid pumping since patient decides to no longer breastfeed   - keflex 500mg qid x 5 days     #Diet   - regular     #VTE prophylaxis   - SCDs       *Case discussed with Dr. Galeana

## 2023-02-04 NOTE — H&P ADULT - ATTENDING COMMENTS
PPD# 5 s/p  @37weeks readmit for elevated BPs chest pain, and breast pain. At home her BP has been trending upwards. 149/90 at 2AM. Highest 162/103 at noon after she took medication.  Patient instructed to present to L&D    BPs noted elevated and IV hydralazine ordered   monitor blood pressures  Mag sulfate x 24 hours ordered  Patient reports intermittent chest pain.  Cardiology consult ordered after EKG obtained.  Labs ordered  Patient with breast engorgement and mastitis  start antibiotics  cold compresses    admit for observation

## 2023-02-04 NOTE — ED ADULT NURSE REASSESSMENT NOTE - NS ED NURSE REASSESS COMMENT FT1
RN called up to L&D and spoke with EMPERATRIZ chopra, RENAN RN states that pt. c/o chest pain, breast pain, /97 and is 5 days postpartum. Connie spoke with L&D MD and states to send up to L&D.

## 2023-02-04 NOTE — OB POSTPARTUM EVENT NOTE - NS_EVENTSUMMARY1_OBGYN_ALL_OB_FT
patient presented with multiple severe range blood pressures. MD Galeana and MD Vargas at bedside to evaluate patient. orders received for IV hydralazine and magnesium. EKG performed. CXR performed. cardiology consulted.

## 2023-02-04 NOTE — H&P ADULT - NSHPREVIEWOFSYSTEMS_GEN_ALL_CORE
Pt is a   PPD# 5 s/p  @37weeks readmit for elevated BPs chest pain, and breast pain. At home her BP has been trending upwards. 149/90 at 2AM. Highest 162/103 at noon after she took medication. Was advised by MD to come to L&D to be evaluated for persistent elevated blood pressure. She has been taking labetalol 100 TID since discharge. She reports the chest pain is a sharp substernal pain/under left breast that she has had since discharge. Non radiating, present when she tilts back. Also reports b/l breast engorgement and pain after discontinuing breastfeeding. She has not pumped since 12AM Friday. Says this pain was similar when she discontinued breastfeeding her first child.   Denies Headache, blurry vision, shortness of breath, abdominal pain, N/V, leg pain.

## 2023-02-04 NOTE — ED STATDOCS - PROGRESS NOTE DETAILS
Dorinda Montes for Dr. Ledbetter: 26 y/o female with PMHx of gestational HTN, 5 days postpartum presents to the ED c/o HTN, chest pain, and bilateral breast pain. Patient states the chest pain is worse when lying down or sitting back. Patient had been breastfeeding but has since stopped; last nursed 2 days ago. Will send to [facility] for further evaluation. Dorinda Montes for Dr. Ledbetter: 26 y/o female with PMHx of gestational HTN, 5 days postpartum presents to the ED c/o HTN, chest pain, and bilateral breast pain. Patient states the chest pain is worse when lying down or sitting back. Patient had been breastfeeding but has since stopped; last nursed 2 days ago. BP elevated in ED at 185/97. Will send to [facility] for further evaluation.

## 2023-02-04 NOTE — ED ADULT TRIAGE NOTE - CHIEF COMPLAINT QUOTE
Pt presents to ER 5 days postpartum c/o CP, HTN and b/l breast pain. Onset of symptoms began 5 days PTA. Pt reports hx of gestational hypertension. Pt reports she has not been breast feeding/pumping since giving birth.

## 2023-02-04 NOTE — H&P ADULT - NSHPPHYSICALEXAM_GEN_ALL_CORE
PHYSICAL EXAM:    Vital Signs Last 24 Hrs  T(C): 37.1 (04 Feb 2023 14:00), Max: 37.1 (04 Feb 2023 14:00)  T(F): 98.7 (04 Feb 2023 14:00), Max: 98.7 (04 Feb 2023 14:00)  HR: 65 (04 Feb 2023 14:00) (65 - 65)  BP: 185/97 (04 Feb 2023 14:00) (185/97 - 185/97)  BP(mean): 122 (04 Feb 2023 14:00) (122 - 122)  RR: 16 (04 Feb 2023 14:00) (16 - 16)  SpO2: 99% (04 Feb 2023 14:00) (99% - 99%)      GENERAL: NAD, lying in bed comfortably  HEAD:  Atraumatic, Normocephalic  CHEST/LUNG: Clear to auscultation bilaterally, good air entry bilaterally; No wheezing, rales, or rhonchi. Unlabored respirations  HEART: Regular rate and rhythm. S1 and S2. No murmurs, rubs, or gallops  ABDOMEN: Soft, Nontender, Nondistended.   EXTREMITIES:  2+ Peripheral Pulses. Capillary refill <2 seconds. No clubbing, cyanosis, mild edema  NERVOUS SYSTEM:  Alert & Oriented X3, speech clear. No deficits   MSK: FROM all 4 extremities, full and equal strength  SKIN: No rashes, bruises, or other lesions

## 2023-02-05 DIAGNOSIS — Z29.9 ENCOUNTER FOR PROPHYLACTIC MEASURES, UNSPECIFIED: ICD-10-CM

## 2023-02-05 DIAGNOSIS — N61.0 MASTITIS WITHOUT ABSCESS: ICD-10-CM

## 2023-02-05 LAB
ALBUMIN SERPL ELPH-MCNC: 2.7 G/DL — LOW (ref 3.3–5)
ALP SERPL-CCNC: 82 U/L — SIGNIFICANT CHANGE UP (ref 40–120)
ALT FLD-CCNC: 29 U/L — SIGNIFICANT CHANGE UP (ref 12–78)
ANION GAP SERPL CALC-SCNC: 7 MMOL/L — SIGNIFICANT CHANGE UP (ref 5–17)
AST SERPL-CCNC: 21 U/L — SIGNIFICANT CHANGE UP (ref 15–37)
BILIRUB SERPL-MCNC: 0.2 MG/DL — SIGNIFICANT CHANGE UP (ref 0.2–1.2)
BUN SERPL-MCNC: 7 MG/DL — SIGNIFICANT CHANGE UP (ref 7–23)
CALCIUM SERPL-MCNC: 7.1 MG/DL — LOW (ref 8.5–10.1)
CHLORIDE SERPL-SCNC: 108 MMOL/L — SIGNIFICANT CHANGE UP (ref 96–108)
CO2 SERPL-SCNC: 24 MMOL/L — SIGNIFICANT CHANGE UP (ref 22–31)
CREAT SERPL-MCNC: 0.55 MG/DL — SIGNIFICANT CHANGE UP (ref 0.5–1.3)
EGFR: 127 ML/MIN/1.73M2 — SIGNIFICANT CHANGE UP
GLUCOSE SERPL-MCNC: 95 MG/DL — SIGNIFICANT CHANGE UP (ref 70–99)
HCT VFR BLD CALC: 38.7 % — SIGNIFICANT CHANGE UP (ref 34.5–45)
HGB BLD-MCNC: 12.7 G/DL — SIGNIFICANT CHANGE UP (ref 11.5–15.5)
MAGNESIUM SERPL-MCNC: 6.4 MG/DL — HIGH (ref 1.6–2.6)
MAGNESIUM SERPL-MCNC: 6.6 MG/DL — HIGH (ref 1.6–2.6)
MAGNESIUM SERPL-MCNC: 6.7 MG/DL — HIGH (ref 1.6–2.6)
MCHC RBC-ENTMCNC: 28.8 PG — SIGNIFICANT CHANGE UP (ref 27–34)
MCHC RBC-ENTMCNC: 32.8 GM/DL — SIGNIFICANT CHANGE UP (ref 32–36)
MCV RBC AUTO: 87.8 FL — SIGNIFICANT CHANGE UP (ref 80–100)
PLATELET # BLD AUTO: 185 K/UL — SIGNIFICANT CHANGE UP (ref 150–400)
POTASSIUM SERPL-MCNC: 3.4 MMOL/L — LOW (ref 3.5–5.3)
POTASSIUM SERPL-SCNC: 3.4 MMOL/L — LOW (ref 3.5–5.3)
PROT SERPL-MCNC: 6.7 GM/DL — SIGNIFICANT CHANGE UP (ref 6–8.3)
RBC # BLD: 4.41 M/UL — SIGNIFICANT CHANGE UP (ref 3.8–5.2)
RBC # FLD: 14.2 % — SIGNIFICANT CHANGE UP (ref 10.3–14.5)
SODIUM SERPL-SCNC: 139 MMOL/L — SIGNIFICANT CHANGE UP (ref 135–145)
WBC # BLD: 7.05 K/UL — SIGNIFICANT CHANGE UP (ref 3.8–10.5)
WBC # FLD AUTO: 7.05 K/UL — SIGNIFICANT CHANGE UP (ref 3.8–10.5)

## 2023-02-05 PROCEDURE — 99232 SBSQ HOSP IP/OBS MODERATE 35: CPT

## 2023-02-05 RX ORDER — CEPHALEXIN 500 MG
500 CAPSULE ORAL
Refills: 0 | Status: DISCONTINUED | OUTPATIENT
Start: 2023-02-05 | End: 2023-02-06

## 2023-02-05 RX ORDER — IBUPROFEN 200 MG
600 TABLET ORAL EVERY 6 HOURS
Refills: 0 | Status: DISCONTINUED | OUTPATIENT
Start: 2023-02-05 | End: 2023-02-06

## 2023-02-05 RX ORDER — FAMOTIDINE 10 MG/ML
20 INJECTION INTRAVENOUS ONCE
Refills: 0 | Status: COMPLETED | OUTPATIENT
Start: 2023-02-05 | End: 2023-02-05

## 2023-02-05 RX ORDER — MAGNESIUM SULFATE 500 MG/ML
1.5 VIAL (ML) INJECTION
Qty: 40 | Refills: 0 | Status: DISCONTINUED | OUTPATIENT
Start: 2023-02-05 | End: 2023-02-06

## 2023-02-05 RX ADMIN — Medication 30 MILLIGRAM(S): at 15:56

## 2023-02-05 RX ADMIN — Medication 30 MILLIGRAM(S): at 11:32

## 2023-02-05 RX ADMIN — Medication 30 MILLIGRAM(S): at 12:30

## 2023-02-05 RX ADMIN — Medication 30 MILLIGRAM(S): at 00:50

## 2023-02-05 RX ADMIN — Medication 975 MILLIGRAM(S): at 22:09

## 2023-02-05 RX ADMIN — FAMOTIDINE 20 MILLIGRAM(S): 10 INJECTION INTRAVENOUS at 14:08

## 2023-02-05 RX ADMIN — Medication 30 MILLIGRAM(S): at 00:32

## 2023-02-05 RX ADMIN — Medication 30 MILLIGRAM(S): at 06:30

## 2023-02-05 RX ADMIN — Medication 37.5 GM/HR: at 11:35

## 2023-02-05 RX ADMIN — Medication 975 MILLIGRAM(S): at 08:51

## 2023-02-05 RX ADMIN — Medication 200 MILLIGRAM(S): at 18:04

## 2023-02-05 RX ADMIN — Medication 975 MILLIGRAM(S): at 09:50

## 2023-02-05 RX ADMIN — Medication 975 MILLIGRAM(S): at 21:16

## 2023-02-05 RX ADMIN — Medication 600 MILLIGRAM(S): at 23:58

## 2023-02-05 RX ADMIN — Medication 30 MILLIGRAM(S): at 06:01

## 2023-02-05 RX ADMIN — Medication 975 MILLIGRAM(S): at 02:54

## 2023-02-05 RX ADMIN — Medication 200 MILLIGRAM(S): at 05:42

## 2023-02-05 RX ADMIN — Medication 500 MILLIGRAM(S): at 23:58

## 2023-02-05 RX ADMIN — Medication 975 MILLIGRAM(S): at 03:30

## 2023-02-05 RX ADMIN — Medication 500 MILLIGRAM(S): at 18:03

## 2023-02-05 RX ADMIN — Medication 975 MILLIGRAM(S): at 15:00

## 2023-02-05 RX ADMIN — Medication 975 MILLIGRAM(S): at 14:08

## 2023-02-05 RX ADMIN — Medication 500 MILLIGRAM(S): at 11:33

## 2023-02-05 NOTE — PROGRESS NOTE ADULT - PROBLEM SELECTOR PLAN 2
Blood pressure improved. Continue nifedipine and labetalol. She will follow up with Dr. Palla on Thursday for BP evaluation and Echo.

## 2023-02-05 NOTE — CONSULT NOTE ADULT - PROBLEM SELECTOR RECOMMENDATION 2
Improved. Agree with nifedipine and labetalol. D/W resident.
-SCDs  -start chemoppx if hospitalized HD#3

## 2023-02-05 NOTE — CONSULT NOTE ADULT - PROBLEM SELECTOR RECOMMENDATION 4
Patient admitted with severe range blood pressures requiring IVP hydralazine 5+10+10. She was started on magnesium sulfate infusion. Patient was previously on labetalol 100mg BID on admission, which was increased to 200mg BID with the addition of procardia 30mg XL. BPs currently controlled at 134/81.  - repeat [facility] labs wnl  - magnesium serum draws 6>6.4. magnesium was dropped to 1.5g/hr> 6.6. Will continue.   - BP monitoring

## 2023-02-05 NOTE — CONSULT NOTE ADULT - SUBJECTIVE AND OBJECTIVE BOX
PCP:    REQUESTING PHYSICIAN:    REASON FOR CONSULT:    CHIEF COMPLAINT:    HPI: 28 yo female seen 5 days after the delivery of her second child who complains of sharp chest pain when supine. Pt is reported to be 5/10. Pt reports that pain began after returning home from delivery. Pt has pre-eclampsia post partum although blood pressure today is improved after nifedipine. Pt denies history of CAD or exertional pain in the past. Pt reports that she had similar pain after her first delivery.       PAST MEDICAL & SURGICAL HISTORY:  No pertinent past medical history          Allergies    No Known Allergies    Intolerances        SOCIAL HISTORY:    FAMILY HISTORY:      MEDICATIONS:  MEDICATIONS  (STANDING):  acetaminophen     Tablet .. 975 milliGRAM(s) Oral <User Schedule>  cephalexin 500 milliGRAM(s) Oral four times a day  ketorolac   Injectable 30 milliGRAM(s) IV Push every 6 hours  labetalol 200 milliGRAM(s) Oral two times a day  lactated ringers. 1000 milliLiter(s) (75 mL/Hr) IV Continuous <Continuous>  magnesium sulfate Infusion 2 Gm/Hr (50 mL/Hr) IV Continuous <Continuous>  NIFEdipine XL 30 milliGRAM(s) Oral every 24 hours    MEDICATIONS  (PRN):      REVIEW OF SYSTEMS:    CONSTITUTIONAL: No weakness, fevers or chills  EYES/ENT: No visual changes;  No vertigo or throat pain   NECK: No pain or stiffness  RESPIRATORY: No cough, wheezing, hemoptysis; No shortness of breath  CARDIOVASCULAR: Chest pain  GASTROINTESTINAL: No abdominal or epigastric pain. No nausea, vomiting, or hematemesis; No diarrhea or constipation. No melena or hematochezia.  GENITOURINARY: No dysuria, frequency or hematuria  NEUROLOGICAL: No numbness or weakness  SKIN: No itching, burning, rashes, or lesions   All other review of systems is negative unless indicated above    Vital Signs Last 24 Hrs  T(C): 36.8 (04 Feb 2023 15:25), Max: 37.1 (04 Feb 2023 14:00)  T(F): 98.2 (04 Feb 2023 15:25), Max: 98.7 (04 Feb 2023 14:00)  HR: 97 (04 Feb 2023 17:20) (60 - 100)  BP: 149/73 (04 Feb 2023 17:20) (147/75 - 187/82)  BP(mean): 122 (04 Feb 2023 14:00) (122 - 122)  RR: 17 (04 Feb 2023 15:25) (16 - 17)  SpO2: 99% (04 Feb 2023 15:25) (99% - 99%)    Parameters below as of 04 Feb 2023 15:25  Patient On (Oxygen Delivery Method): room air        I&O's Summary      PHYSICAL EXAM:    Constitutional: NAD, awake and alert, well-developed  HEENT: PERR, EOMI,  No oral cyananosis.  Neck:  supple,  No JVD  Respiratory: Breath sounds are clear bilaterally, No wheezing, rales or rhonchi  Cardiovascular: S1 and S2, regular rate and rhythm, no Murmurs, gallops or rubs  Gastrointestinal: Post partum abdomen  Extremities: No peripheral edema. No clubbing or cyanosis.  Vascular: 2+ peripheral pulses  Neurological: A/O x 3, no focal deficits  Musculoskeletal: no calf tenderness.  Skin: No rashes.      LABS: All Labs Reviewed:                        12.6   7.74  )-----------( 204      ( 04 Feb 2023 15:49 )             36.7                         12.5   8.92  )-----------( 166      ( 02 Feb 2023 07:25 )             37.4     04 Feb 2023 15:49    139    |  108    |  9      ----------------------------<  85     3.6     |  24     |  0.59   02 Feb 2023 07:25    138    |  107    |  8      ----------------------------<  85     3.7     |  25     |  0.64     Ca    9.0        04 Feb 2023 15:49  Ca    9.1        02 Feb 2023 07:25    TPro  7.1    /  Alb  3.0    /  TBili  0.3    /  DBili  x      /  AST  25     /  ALT  32     /  AlkPhos  89     04 Feb 2023 15:49  TPro  7.0    /  Alb  2.7    /  TBili  0.3    /  DBili  x      /  AST  26     /  ALT  22     /  AlkPhos  94     02 Feb 2023 07:25    PT/INR - ( 04 Feb 2023 15:49 )   PT: 11.0 sec;   INR: 0.95 ratio         PTT - ( 04 Feb 2023 15:49 )  PTT:32.0 sec      Blood Culture:   02-04 @ 15:49  Pro Bnp 282        RADIOLOGY/EKG:< from: 12 Lead ECG (02.01.23 @ 07:16) >  Diagnosis Line Normal sinus rhythmwith sinus arrhythmia  Normal ECG  When compared with ECG of 15-FEB-2021 21:19,  Vent. rate has decreased BY  39 BPM  Confirmed by Mathieu BAIG, Randy ([id number]) on 2/1/2023 7:20:38 PM    < end of copied text >      
KRISHAN RING  A 27year old  PPD#6 s/p  admitted for postpartum preeclampsia. HD#2.      HPI:   Pt is a   PPD# 5 s/p  @37weeks readmit for elevated BPs chest pain, and breast pain. At home her BP has been trending upwards. 149/90 at 2AM. Highest 162/103 at noon after she took medication. Was advised by MD to come to L&D to be evaluated for persistent elevated blood pressure. She has been taking labetalol 100 TID since discharge. She reports the chest pain is a sharp substernal pain/under left breast that she has had since discharge. Non radiating, present when she tilts back. Also reports b/l breast engorgement and pain after discontinuing breastfeeding. She has not pumped since 12. Says this pain was similar when she discontinued breastfeeding her first child.   Denies Headache, blurry vision, shortness of breath, abdominal pain, N/V, leg pain.      PAST 24H:  Patient was admitted; started on magnesium sulfate infusion; labetalol increased to 200mg BID and procardia 30mg XL.       SUBJECTIVE:  Patient reports doing well overnight.   She reports feeling sleepy, tired, and her body feels heavy. She was reassured these are all common symptoms of the magnesium.   Tolerating PO diet; voiding, +BM.   Denies any pain at this time.   Denies headache, vision changes, SOB, RUQ pain, and leg swelling.   She reports continue chest discomfort on the left side that was same as she had yesterday.     REVIEW OF SYSTEMS:    CONSTITUTIONAL: + weakness. No fevers or chills  EYES/ENT: No visual changes;  No vertigo or throat pain   NECK: No pain or stiffness  RESPIRATORY: No cough, wheezing, hemoptysis; No shortness of breath  CARDIOVASCULAR: + chest pain. No palpitations  GASTROINTESTINAL: No abdominal or epigastric pain. No nausea, vomiting, or hematemesis; No diarrhea or constipation. No melena or hematochezia.  GENITOURINARY: No dysuria, frequency or hematuria  NEUROLOGICAL: No numbness or weakness  SKIN: No itching, burning, rashes, or lesions   All other review of systems is negative unless indicated above.    PAST MEDICAL & SURGICAL HISTORY:  History of preeclampsia   x 2    Allergies:  No Known Allergies      FAMILY HISTORY:    Social History: Denies ETOH, smoking and drugs.     Past OB/GYN Hx:  -  @37w, IOL for PEC   - SAB x 1    Vital Signs:  Vital Signs Last 24 Hrs  T(C): 36.5 (2023 09:30), Max: 37.1 (2023 14:00)  T(F): 97.7 (2023 09:30), Max: 98.7 (2023 14:00)  HR: 84 (2023 09:30) (60 - 100)  BP: 134/81 (2023 09:30) (94/50 - 187/82)  BP(mean): 122 (2023 14:00) (122 - 122)  RR: 18 (2023 09:30) (15 - 18)  SpO2: 98% (2023 09:30) (98% - 99%)    Parameters below as of 2023 09:30  Patient On (Oxygen Delivery Method): room air      Height (cm): 157.5 (23 @ 21:28), 157.5 (23 @ 13:59)  Weight (kg): 86.183 (23 @ 21:28), 86.2 (23 @ 13:59)  BMI (kg/m2): 34.7 (23 @ 21:28), 34.7 (23 @ 21:28), 34.7 (23 @ 13:59)  BSA (m2): 1.87 (23 @ 21:28), 1.87 (23 @ 21:28), 1.87 (23 @ 13:59)    Physical Exam:  General: Adult female in NAD  Head/Neck: No neck masses, no lymphadenopathy  CVS: RRR, +S1/S2, no murmurs  Lungs: CTAB, no wheezing, rhonchi or rales  Chest: tender to palpation on pt's left side of sternum   Abdomen: soft, non-tender, gravid uterus  Pelvic: Deferred  Ext: No cyanosis, edema or calf tenderness  Skin: No rashes or lesions on exposed skin  Neuro: Normal DTRs, grossly intact    Labs:                          12.7   7.05  )-----------( 185      ( 2023 08:25 )             38.7     02-05    139  |  108  |  7   ----------------------------<  95  3.4<L>   |  24  |  0.55    Ca    7.1<L>      2023 08:25  Mg     6.6     02-05    TPro  6.7  /  Alb  2.7<L>  /  TBili  0.2  /  DBili  x   /  AST  21  /  ALT  29  /  AlkPhos  82  02-05    PT/INR - ( 2023 15:49 )   PT: 11.0 sec;   INR: 0.95 ratio         PTT - ( 2023 15:49 )  PTT:32.0 sec            MEDICATIONS  (STANDING):  acetaminophen     Tablet .. 975 milliGRAM(s) Oral <User Schedule>  cephalexin 500 milliGRAM(s) Oral four times a day  ketorolac   Injectable 30 milliGRAM(s) IV Push every 6 hours  labetalol 200 milliGRAM(s) Oral two times a day  lactated ringers. 1000 milliLiter(s) (75 mL/Hr) IV Continuous <Continuous>  magnesium sulfate Infusion 1.5 Gm/Hr (37.5 mL/Hr) IV Continuous <Continuous>  NIFEdipine XL 30 milliGRAM(s) Oral every 24 hours

## 2023-02-05 NOTE — CONSULT NOTE ADULT - PROBLEM SELECTOR RECOMMENDATION 9
Pt complains of sharp, constant chest pain. Pain is worse when supine, relieved when sitting upright. ECG reveals NSR without changes consistent with pericardial or coronary disease. Pain improved immediately post partum with NSAIDS. Recommend Toradol followed by ibuprofen. If pain persists will obtain an Echo tomorrow.
Pts breast engorged and erythematous after discontinuation of pumping on admission. Patient desires to discontinue breastfeeding at this time.   - Afebrile, subjective chills   - continue to massage breast for symptom relief   - cold compress   - avoid pumping since patient decides to no longer breastfeed   - keflex 500mg qid x 5 days

## 2023-02-05 NOTE — PROVIDER CONTACT NOTE (OTHER) - DATE AND TIME:
05-Feb-2023 18:24
Please follow up with a Cardiologist in a week. Return to the ER if you develop any concerning symptoms.     Chest Pain    Chest pain can be caused by many different conditions which may or may not be dangerous. Causes include heartburn, lung infections, heart attack, blood clot in lungs, skin infections, strain or damage to muscle, cartilage, or bones, etc. In addition to a history and physical examination, an electrocardiogram (ECG) or other lab tests may have been performed to determine the cause of your chest pain. Follow up with your primary care provider or with a cardiologist as instructed.     SEEK IMMEDIATE MEDICAL CARE IF YOU HAVE ANY OF THE FOLLOWING SYMPTOMS: worsening chest pain, coughing up blood, unexplained back/neck/jaw pain, severe abdominal pain, dizziness or lightheadedness, fainting, shortness of breath, sweaty or clammy skin, vomiting, or racing heart beat. These symptoms may represent a serious problem that is an emergency. Do not wait to see if the symptoms will go away. Get medical help right away. Call 911 and do not drive yourself to the hospital.    Hypertension    Hypertension, commonly called high blood pressure, is when the force of blood pumping through your arteries is too strong. Hypertension forces your heart to work harder to pump blood. Your arteries may become narrow or stiff. Having untreated or uncontrolled hypertension for a long period of time can cause heart attack, stroke, kidney disease, and other problems. If started on a medication, take exactly as prescribed by your health care professional. Maintain a healthy lifestyle and follow up with your primary care physician.    SEEK IMMEDIATE MEDICAL CARE IF YOU HAVE ANY OF THE FOLLOWING SYMPTOMS: severe headache, confusion, chest pain, abdominal pain, vomiting, or shortness of breath.

## 2023-02-05 NOTE — PROGRESS NOTE ADULT - PROBLEM SELECTOR PLAN 1
Pt reports that her discomfort improved after NSAIDS. She feels that it was exacerbated by upper extremity movement. She denies shortness of breath.

## 2023-02-05 NOTE — CONSULT NOTE ADULT - PROBLEM SELECTOR RECOMMENDATION 3
Patient presented with non radiating, sharp positional pain that's reproducible with palpation.  - EKG shows NSR with "possible left atrial enlargement"   - r/o postpartum cardiomyopathy   - BNP elevated, likely secondary to preeclampsia; troponin negative   - chest xray wnl  - cardiology consulted, recommendations appreciated.   - pain likely due to costochondritis; reduced to discomfort today  - continue toradol, can consider transition to PO ibuprofen today

## 2023-02-05 NOTE — CONSULT NOTE ADULT - ASSESSMENT
27year old  PPD#6 s/p  admitted for postpartum preeclampsia. HD#2.                #Diet   - regular     #VTE prophylaxis   - SCDs

## 2023-02-06 ENCOUNTER — TRANSCRIPTION ENCOUNTER (OUTPATIENT)
Age: 28
End: 2023-02-06

## 2023-02-06 ENCOUNTER — NON-APPOINTMENT (OUTPATIENT)
Age: 28
End: 2023-02-06

## 2023-02-06 VITALS
SYSTOLIC BLOOD PRESSURE: 141 MMHG | HEART RATE: 83 BPM | TEMPERATURE: 98 F | RESPIRATION RATE: 16 BRPM | OXYGEN SATURATION: 99 % | DIASTOLIC BLOOD PRESSURE: 78 MMHG

## 2023-02-06 RX ORDER — LABETALOL HCL 100 MG
1 TABLET ORAL
Qty: 60 | Refills: 0
Start: 2023-02-06 | End: 2023-03-07

## 2023-02-06 RX ORDER — ACETAMINOPHEN 500 MG
3 TABLET ORAL
Qty: 0 | Refills: 0 | DISCHARGE
Start: 2023-02-06

## 2023-02-06 RX ORDER — IBUPROFEN 200 MG
1 TABLET ORAL
Qty: 0 | Refills: 0 | DISCHARGE
Start: 2023-02-06

## 2023-02-06 RX ORDER — NIFEDIPINE 30 MG
1 TABLET, EXTENDED RELEASE 24 HR ORAL
Qty: 30 | Refills: 0
Start: 2023-02-06 | End: 2023-03-07

## 2023-02-06 RX ORDER — CEPHALEXIN 500 MG
1 CAPSULE ORAL
Qty: 12 | Refills: 0
Start: 2023-02-06 | End: 2023-02-08

## 2023-02-06 RX ADMIN — Medication 600 MILLIGRAM(S): at 05:39

## 2023-02-06 RX ADMIN — Medication 600 MILLIGRAM(S): at 06:30

## 2023-02-06 RX ADMIN — Medication 600 MILLIGRAM(S): at 00:30

## 2023-02-06 RX ADMIN — Medication 500 MILLIGRAM(S): at 05:51

## 2023-02-06 RX ADMIN — Medication 975 MILLIGRAM(S): at 09:16

## 2023-02-06 RX ADMIN — Medication 200 MILLIGRAM(S): at 05:39

## 2023-02-06 NOTE — DISCHARGE NOTE PROVIDER - HOSPITAL COURSE
Pt admitted for postpartum preeclampsia require emergent medication for blood pressure control. She received a magnesium infusion for seizure prophylaxis and her oral blood pressure medications were adjusted for better control. Pt was also started on antibiotics for mastitis, and they were sent to her pharmacy for her to continue taking at home. At time of discharge, BP is normotensive and labs are stable. Patient has scheduled follow up with cardiology for later this week.

## 2023-02-06 NOTE — DISCHARGE NOTE PROVIDER - NSDCMRMEDTOKEN_GEN_ALL_CORE_FT
acetaminophen 325 mg oral tablet: 3 tab(s) orally every 6 hours  cephalexin 500 mg oral capsule: 1 cap(s) orally 4 times a day  ibuprofen 600 mg oral tablet: 1 tab(s) orally every 6 hours, As needed, Mild Pain (1 - 3), Moderate Pain (4 - 6)  labetalol 200 mg oral tablet: 1 tab(s) orally 2 times a day  NIFEdipine 30 mg oral tablet, extended release: 1 tab(s) orally every 24 hours  Prenatal Multivitamins with Folic Acid 1 mg oral tablet: 1 tab(s) orally once a day

## 2023-02-06 NOTE — DISCHARGE NOTE PROVIDER - NSDCFUADDINST_GEN_ALL_CORE_FT
Follow up with your provider in 2-3 days for blood pressure check. Monitor blood pressures at home. Come to L&D if you begin experiencing headaches not relieved by pain meds, nausea or vomiting, shortness of breath, or vision changes. Please call your provider to schedule  a visit for 6 weeks postpartum. Take medications as directed, regular diet, activity as tolerated. Exclusive breast feeding for the first 6 months is recommended. Nothing per vagina for 6 weeks (incl. sex, douching, etc). If you have additional concerns, please inform your provider.

## 2023-02-06 NOTE — PROGRESS NOTE ADULT - ASSESSMENT
A/P:  27year old  PPD#7 s/p  admitted for postpartum preeclampsia. HD#3  -Vital signs stable. BPs 120-140s/60s Blood Pressure on current regimen Procardia 30XL, labetalol 200 TID  -Hgb: 12  -Voiding, tolerating PO, bowel function nml. On Keflex for UTI  -Advance care as tolerated   -Continue routine postpartum care and education  -Dispo: Pt is stable, doing well and meeting all postpartum and postoperative milestones. Possible discharge to home today pending attending approval. Has appointment on Thursday with Dr. Palla for Blood Pressure check and echo.

## 2023-02-06 NOTE — PROGRESS NOTE ADULT - SUBJECTIVE AND OBJECTIVE BOX
PCP:    REQUESTING PHYSICIAN:    REASON FOR CONSULT:    CHIEF COMPLAINT:    HPI: 28 yo female seen 5 days after the delivery of her second child who complains of sharp chest pain when supine. Pt is reported to be 5/10. Pt reports that pain began after returning home from delivery. Pt has pre-eclampsia post partum although blood pressure today is improved after nifedipine. Pt denies history of CAD or exertional pain in the past. Pt reports that she had similar pain after her first delivery.   2/5/23: Pt feels much improved today. She is lying supine without discomfort. B/P is controlled.         PAST MEDICAL & SURGICAL HISTORY:  No pertinent past medical history          SOCIAL HISTORY:    FAMILY HISTORY:      ALLERGIES:  Allergies    No Known Allergies    Intolerances        MEDICATIONS:    MEDICATIONS  (STANDING):  acetaminophen     Tablet .. 975 milliGRAM(s) Oral <User Schedule>  cephalexin 500 milliGRAM(s) Oral four times a day  ketorolac   Injectable 30 milliGRAM(s) IV Push every 6 hours  labetalol 200 milliGRAM(s) Oral two times a day  lactated ringers. 1000 milliLiter(s) (75 mL/Hr) IV Continuous <Continuous>  magnesium sulfate Infusion 1.5 Gm/Hr (37.5 mL/Hr) IV Continuous <Continuous>  NIFEdipine XL 30 milliGRAM(s) Oral every 24 hours    MEDICATIONS  (PRN):      REVIEW OF SYSTEMS:    CONSTITUTIONAL: No weakness, fevers or chills  EYES/ENT: No visual changes;  No vertigo or throat pain   NECK: No pain or stiffness  RESPIRATORY: No cough, wheezing, hemoptysis; No shortness of breath  CARDIOVASCULAR: No chest pain or palpitations  GASTROINTESTINAL: No abdominal or epigastric pain. No nausea, vomiting, or hematemesis; No diarrhea or constipation. No melena or hematochezia.  GENITOURINARY: No dysuria, frequency or hematuria  NEUROLOGICAL: No numbness or weakness  SKIN: No itching, burning, rashes, or lesions   All other review of systems is negative unless indicated above    Vital Signs Last 24 Hrs  T(C): 36.5 (05 Feb 2023 09:30), Max: 37.1 (04 Feb 2023 14:00)  T(F): 97.7 (05 Feb 2023 09:30), Max: 98.7 (04 Feb 2023 14:00)  HR: 84 (05 Feb 2023 09:30) (60 - 100)  BP: 134/81 (05 Feb 2023 09:30) (94/50 - 187/82)  BP(mean): 122 (04 Feb 2023 14:00) (122 - 122)  RR: 18 (05 Feb 2023 09:30) (15 - 18)  SpO2: 98% (05 Feb 2023 09:30) (98% - 99%)    Parameters below as of 05 Feb 2023 09:30  Patient On (Oxygen Delivery Method): room air    Daily     Daily I&O's Summary    04 Feb 2023 07:01  -  05 Feb 2023 07:00  --------------------------------------------------------  IN: 1225 mL / OUT: 1850 mL / NET: -625 mL    05 Feb 2023 07:01  -  05 Feb 2023 10:22  --------------------------------------------------------  IN: 0 mL / OUT: 600 mL / NET: -600 mL        PHYSICAL EXAM:    Constitutional: NAD, awake and alert, well-developed  HEENT: PERR, EOMI,  No oral cyananosis.  Neck:  supple,  No JVD  Respiratory: Breath sounds are clear bilaterally, No wheezing, rales or rhonchi  Cardiovascular: S1 and S2, regular rate and rhythm, no Murmurs, gallops or rubs  Gastrointestinal: Bowel Sounds present, soft, nontender.   Extremities: No peripheral edema. No clubbing or cyanosis.  Vascular: 2+ peripheral pulses  Neurological: A/O x 3, no focal deficits  Musculoskeletal: no calf tenderness.  Skin: No rashes.      LABS: All Labs Reviewed:                        12.7   7.05  )-----------( 185      ( 05 Feb 2023 08:25 )             38.7                         12.6   7.74  )-----------( 204      ( 04 Feb 2023 15:49 )             36.7     05 Feb 2023 08:25    139    |  108    |  7      ----------------------------<  95     3.4     |  24     |  0.55   04 Feb 2023 15:49    139    |  108    |  9      ----------------------------<  85     3.6     |  24     |  0.59     Ca    7.1        05 Feb 2023 08:25  Ca    9.0        04 Feb 2023 15:49  Mg     6.6       05 Feb 2023 08:25  Mg     6.4       05 Feb 2023 03:45  Mg     6.0       04 Feb 2023 21:39    TPro  6.7    /  Alb  2.7    /  TBili  0.2    /  DBili  x      /  AST  21     /  ALT  29     /  AlkPhos  82     05 Feb 2023 08:25  TPro  7.1    /  Alb  3.0    /  TBili  0.3    /  DBili  x      /  AST  25     /  ALT  32     /  AlkPhos  89     04 Feb 2023 15:49    PT/INR - ( 04 Feb 2023 15:49 )   PT: 11.0 sec;   INR: 0.95 ratio         PTT - ( 04 Feb 2023 15:49 )  PTT:32.0 sec      Blood Culture:   02-04 @ 15:49  Pro Bnp 282        RADIOLOGY/EKG:< from: 12 Lead ECG (02.04.23 @ 14:05) >  Diagnosis Line Normal sinus rhythm  Possible Left atrial enlargement  Incomplete right bundle branch block  Nonspecific T wave abnormality  Abnormal ECG  When compared with ECG of 01-FEB-2023 07:16,  Incomplete right bundle branch block is now Present  Confirmed by Mathieu BAIG, Randy ([id number]) on 2/5/2023 9:05:46 AM    < end of copied text >        ECHO/CARDIAC CATHTERIZATION/STRESS TEST:  
KRISHAN RING is a 27year old  PPD#7 s/p  admitted for postpartum preeclampsia. HD#3    S:    No acute events overnight.   The patient has no complaints.  Pain controlled with current treatment regimen.   She is ambulating without difficulty and tolerating PO.   + flatus/+BM/+ voiding   She endorses appropriate lochia, which is decreasing.   She is breastfeeding without difficulty.   She denies lightheadedness, dizziness, palpitations, chest pain and SOB.   BPs under good control with new regimen    O:    T(C): 36.7 (23 @ 05:35), Max: 36.8 (23 @ 21:34)  HR: 74 (23 @ 05:35) (71 - 84)  BP: 130/88 (23 @ 05:35) (126/76 - 142/86)  RR: 16 (23 @ 05:35) (16 - 18)  SpO2: 99% (23 @ 05:35) (98% - 100%)    Gen: NAD, AOx3  CV: RRR, S1/S2 present  Pulm: CTAB  Breast: Nontender, non-engorged   Abdomen:  Soft, non-tender, non-distended, +bowel sounds  Uterus:  Fundus firm below umbilicus  VE:  Expectant lochia  Ext:  Non-tender and non-edematous                          12.7   7.05  )-----------( 185      ( 2023 08:25 )             38.7         139  |  108  |  7   ----------------------------<  95  3.4<L>   |  24  |  0.55    Ca    7.1<L>      2023 08:25  Mg     6.7         TPro  6.7  /  Alb  2.7<L>  /  TBili  0.2  /  DBili  x   /  AST  21  /  ALT  29  /  AlkPhos  82  -

## 2023-02-06 NOTE — DISCHARGE NOTE PROVIDER - CARE PROVIDER_API CALL
Fiorella Mckeon (DO)  Obstetrics and Gynecology  [address] [facility], [address] [place] [address]  Phone: [phone]  Fax: [phone]  Follow Up Time: 1-3 days

## 2023-02-06 NOTE — DISCHARGE NOTE PROVIDER - NSDCFUSCHEDAPPT_GEN_ALL_CORE_FT
Palla, Venugopal R  [facility], [facility]  CARDIOLOGY [address] [facility] [address]  Scheduled Appointment: 02/09/2023

## 2023-02-06 NOTE — DISCHARGE NOTE PROVIDER - NSDCCPCAREPLAN_GEN_ALL_CORE_FT
PRINCIPAL DISCHARGE DIAGNOSIS  Diagnosis: Pre-eclampsia, postpartum  Assessment and Plan of Treatment:       SECONDARY DISCHARGE DIAGNOSES  Diagnosis: Mastitis  Assessment and Plan of Treatment:

## 2023-02-09 ENCOUNTER — NON-APPOINTMENT (OUTPATIENT)
Age: 28
End: 2023-02-09

## 2023-02-09 ENCOUNTER — APPOINTMENT (OUTPATIENT)
Dept: CARDIOLOGY | Facility: CLINIC | Age: 28
End: 2023-02-09
Payer: COMMERCIAL

## 2023-02-09 VITALS
WEIGHT: 174 LBS | RESPIRATION RATE: 15 BRPM | BODY MASS INDEX: 32.02 KG/M2 | HEIGHT: 62 IN | OXYGEN SATURATION: 98 % | DIASTOLIC BLOOD PRESSURE: 70 MMHG | SYSTOLIC BLOOD PRESSURE: 118 MMHG

## 2023-02-09 VITALS
OXYGEN SATURATION: 98 % | DIASTOLIC BLOOD PRESSURE: 70 MMHG | WEIGHT: 174 LBS | SYSTOLIC BLOOD PRESSURE: 118 MMHG | HEART RATE: 92 BPM | HEIGHT: 62 IN | BODY MASS INDEX: 32.02 KG/M2

## 2023-02-09 DIAGNOSIS — Z3A.37 37 WEEKS GESTATION OF PREGNANCY: ICD-10-CM

## 2023-02-09 DIAGNOSIS — Z78.9 OTHER SPECIFIED HEALTH STATUS: ICD-10-CM

## 2023-02-09 DIAGNOSIS — O41.1230 CHORIOAMNIONITIS, THIRD TRIMESTER, NOT APPLICABLE OR UNSPECIFIED: ICD-10-CM

## 2023-02-09 DIAGNOSIS — Z83.438 FAMILY HISTORY OF OTHER DISORDER OF LIPOPROTEIN METABOLISM AND OTHER LIPIDEMIA: ICD-10-CM

## 2023-02-09 DIAGNOSIS — Z82.3 FAMILY HISTORY OF STROKE: ICD-10-CM

## 2023-02-09 DIAGNOSIS — R07.89 OTHER CHEST PAIN: ICD-10-CM

## 2023-02-09 DIAGNOSIS — R07.9 CHEST PAIN, UNSPECIFIED: ICD-10-CM

## 2023-02-09 DIAGNOSIS — O13.9 GESTATIONAL [PREGNANCY-INDUCED] HYPERTENSION W/OUT SIGNIFICANT PROTEINURIA, UNSPECIFIED TRIMESTER: ICD-10-CM

## 2023-02-09 DIAGNOSIS — O92.79 OTHER DISORDERS OF LACTATION: ICD-10-CM

## 2023-02-09 PROCEDURE — 99215 OFFICE O/P EST HI 40 MIN: CPT | Mod: 25

## 2023-02-09 PROCEDURE — 93000 ELECTROCARDIOGRAM COMPLETE: CPT

## 2023-02-09 NOTE — DISCUSSION/SUMMARY
[FreeTextEntry1] : Patient with  above hx \par \par atypical chest pain , musculoskeletal origin , ? uncontrolled hypertension , Negative troponin , normal variant ekg , had negative CT angio for PE , recommend echocardiogram to assess ventricular function  to rule out cardiomyopathy \par \par \par HTN  post partum preeclampsia : improved blood pressure  on current medication , encourage patient to follow low salt diet , home bP monitoring , manju the medication slowly  based on home BP readings ,  echo to assess ventricular function \par \par \par follow up after 1 month

## 2023-02-09 NOTE — HISTORY OF PRESENT ILLNESS
[FreeTextEntry1] : 27 year old female who had pregnancy induced hypertension with her two pregnancies , had delivered baby on jan 30 th , patient developed , localized  left parasternal sharp chest pain on feb 1 st , on and off , only in certain position , not related to breathing , not associated with shortness of breath , did have cardiac evaluation , patient negative CT angiogram for PE ,was reproducible , improved over few days  patient went home feb 2 nd , patient came back to hospital  as she noted to have markedly elevated blood pressure , patient medication were adjusted , was sent home on medication , \par \par Patient denies any recurrence of chest pain or shortness of breath or dizziness or palpitation  , leg swelling improving after delivery \par \par \par Patient was evaluated by my associated  while she was hospitalized , ekg reviewed

## 2023-02-10 LAB — SURGICAL PATHOLOGY STUDY: SIGNIFICANT CHANGE UP

## 2023-02-12 ENCOUNTER — NON-APPOINTMENT (OUTPATIENT)
Age: 28
End: 2023-02-12

## 2023-02-21 ENCOUNTER — NON-APPOINTMENT (OUTPATIENT)
Age: 28
End: 2023-02-21

## 2023-02-23 ENCOUNTER — APPOINTMENT (OUTPATIENT)
Dept: CARDIOLOGY | Facility: CLINIC | Age: 28
End: 2023-02-23
Payer: COMMERCIAL

## 2023-02-23 VITALS
OXYGEN SATURATION: 97 % | SYSTOLIC BLOOD PRESSURE: 122 MMHG | WEIGHT: 178 LBS | DIASTOLIC BLOOD PRESSURE: 60 MMHG | HEIGHT: 62 IN | HEART RATE: 89 BPM | BODY MASS INDEX: 32.76 KG/M2 | TEMPERATURE: 98 F | RESPIRATION RATE: 15 BRPM

## 2023-02-23 VITALS
HEIGHT: 62 IN | HEART RATE: 89 BPM | SYSTOLIC BLOOD PRESSURE: 122 MMHG | DIASTOLIC BLOOD PRESSURE: 60 MMHG | OXYGEN SATURATION: 97 % | BODY MASS INDEX: 32.76 KG/M2 | WEIGHT: 178 LBS | TEMPERATURE: 97.7 F

## 2023-02-23 DIAGNOSIS — R07.89 OTHER CHEST PAIN: ICD-10-CM

## 2023-02-23 PROCEDURE — 99213 OFFICE O/P EST LOW 20 MIN: CPT | Mod: 25

## 2023-02-23 PROCEDURE — 93306 TTE W/DOPPLER COMPLETE: CPT

## 2023-02-23 PROCEDURE — 99213 OFFICE O/P EST LOW 20 MIN: CPT

## 2023-02-23 NOTE — HISTORY OF PRESENT ILLNESS
[FreeTextEntry1] : 27 year old female who had pregnancy induced hypertension with her two pregnancies , had delivered baby on jan 30 th , came for follow up say no recurrence of chest pain , her blood pressure is normal range on medication , no episodes of low BP . Patient had echo showed normal ventricular function , echo reviewed \par \par Patient denies any recurrence of chest pain or shortness of breath or dizziness or palpitation  , leg swelling improved after delivery \par \par \par Patient was evaluated by my associated  while she was hospitalized , ekg reviewed  \par \par patient developed , localized  left parasternal sharp chest pain on feb 1 st , on and off , only in certain position , not related to breathing , not associated with shortness of breath , did have cardiac evaluation , patient negative CT angiogram for PE ,was reproducible , improved over few days  patient went home feb 2 nd , patient came back to hospital  as she noted to have markedly elevated blood pressure , patient medication were adjusted , was sent home on medication ,

## 2023-02-23 NOTE — DISCUSSION/SUMMARY
[FreeTextEntry1] : Patient with  above hx \par \par atypical chest pain , musculoskeletal origin , ? uncontrolled hypertension , Negative troponin , normal variant ekg , had negative CT angio for PE ,  no recurrence of chest pain ,  had normal ventricular function on echo  \par \par \par HTN  post partum preeclampsia : improved blood pressure  on current medication , encourage patient to follow low salt diet , home bP monitoring , manju the medication slowly  based on home BP readings ,  \par \par \par follow up after  2 months

## 2023-03-01 ENCOUNTER — NON-APPOINTMENT (OUTPATIENT)
Age: 28
End: 2023-03-01

## 2023-04-20 ENCOUNTER — APPOINTMENT (OUTPATIENT)
Dept: CARDIOLOGY | Facility: CLINIC | Age: 28
End: 2023-04-20

## 2023-04-26 ENCOUNTER — APPOINTMENT (OUTPATIENT)
Dept: CARDIOLOGY | Facility: CLINIC | Age: 28
End: 2023-04-26
Payer: COMMERCIAL

## 2023-04-26 VITALS
WEIGHT: 183 LBS | BODY MASS INDEX: 33.68 KG/M2 | DIASTOLIC BLOOD PRESSURE: 76 MMHG | OXYGEN SATURATION: 100 % | HEART RATE: 77 BPM | HEIGHT: 62 IN | SYSTOLIC BLOOD PRESSURE: 134 MMHG

## 2023-04-26 PROCEDURE — 99214 OFFICE O/P EST MOD 30 MIN: CPT

## 2023-04-26 RX ORDER — PRENATAL VIT NO.126/IRON/FOLIC 28MG-0.8MG
28-0.8 TABLET ORAL DAILY
Refills: 0 | Status: DISCONTINUED | COMMUNITY
Start: 2023-02-03 | End: 2023-04-26

## 2023-04-26 RX ORDER — LABETALOL HYDROCHLORIDE 200 MG/1
200 TABLET, FILM COATED ORAL 3 TIMES DAILY
Refills: 0 | Status: DISCONTINUED | COMMUNITY
Start: 2023-02-03 | End: 2023-04-26

## 2023-04-26 RX ORDER — NIFEDIPINE 30 MG/1
30 TABLET, EXTENDED RELEASE ORAL DAILY
Refills: 0 | Status: DISCONTINUED | COMMUNITY
End: 2023-04-26

## 2023-04-26 NOTE — HISTORY OF PRESENT ILLNESS
[FreeTextEntry1] : 27 year old female who had pregnancy induced hypertension with her two pregnancies , had delivered baby on jan 30 th , came for follow up say no recurrence of chest pain   , patient stopped her antihypertensive medication , her blood pressure well controlled at office and also at home ,   Patient had blood work showed    ., HDL 48 ,   ,  HCRP 7.4  which are elevated , blood work done 3 months after delivery of baby \par \par \par  . Patient had echo showed normal ventricular function , echo reviewed \par \par Patient denies any recurrence of chest pain or shortness of breath or dizziness or palpitation  , leg swelling improved after delivery \par \par \par \par (patient developed , localized  left parasternal sharp chest pain on feb 1 st , on and off , only in certain position , not related to breathing , not associated with shortness of breath , did have cardiac evaluation , patient negative CT angiogram for PE ,was reproducible , improved over few days  patient went home feb 2 nd , patient came back to hospital  as she noted to have markedly elevated blood pressure , patient medication were adjusted , was sent home on medication , )

## 2023-04-26 NOTE — DISCUSSION/SUMMARY
[FreeTextEntry1] : Patient with  above hx \par \par atypical chest pain , musculoskeletal origin , ? uncontrolled hypertension , Negative troponin , normal variant ekg , had negative CT angio for PE ,  no recurrence of chest pain ,  had normal ventricular function on echo  \par \par \par HTN  post partum preeclampsia : resolved   off of medication  , continue diet restriction , encourage patient to follow low salt diet , home bP monitoring , \par \par Mixed hyperlipidemia : encourage patient to follow life style modification, diet restriction , loose weight , \par repeat blood work  in 6 months \par \par obesity : encourage  patient to loose weight \par \par Elevated HCRP   likely post delivery , family hx of RA , positive DELIO ?  RH one year ago , recent blood work showed  negative for  RA DELIO   normal ESR ,will repeat  after 6 months  ( no fever or chills , or fatigue )   family hx of  RA  , will  monitor \par \par Cardiac murmur : likely flow murmur , had normal echo \par \par \par follow up after  6  months

## 2023-04-26 NOTE — PHYSICAL EXAM
[Well Developed] : well developed [Well Nourished] : well nourished [No Acute Distress] : no acute distress [Normal Conjunctiva] : normal conjunctiva [Normal Venous Pressure] : normal venous pressure [No Carotid Bruit] : no carotid bruit [Normal S1, S2] : normal S1, S2 [No Rub] : no rub [No Gallop] : no gallop [Clear Lung Fields] : clear lung fields [Good Air Entry] : good air entry [No Respiratory Distress] : no respiratory distress  [Soft] : abdomen soft [Non Tender] : non-tender [No Masses/organomegaly] : no masses/organomegaly [Normal Bowel Sounds] : normal bowel sounds [Normal Gait] : normal gait [No Edema] : no edema [No Cyanosis] : no cyanosis [No Clubbing] : no clubbing [No Varicosities] : no varicosities [No Rash] : no rash [No Skin Lesions] : no skin lesions [Moves all extremities] : moves all extremities [No Focal Deficits] : no focal deficits [Normal Speech] : normal speech [Alert and Oriented] : alert and oriented [Normal memory] : normal memory [de-identified] : Systolic murmur

## 2023-07-08 NOTE — CDI QUERY NOTE - NSCDIOTHERTXTBX_GEN_ALL_CORE_HH
Per coding Guidelines:    The assignment of a diagnosis code is based on the provider’s diagnostic statement that the condition exists. The provider’s statement that the patient has a particular condition is sufficient. Code assignment is not based on clinical criteria used by the provider to establish the diagnosis. If there is conflicting medical record documentation or clinical indicators a query to the provider may be needed for clarification or validity.        Please clarify after further study, evaluation, and treatment if you feel that Chorioamnionitis was:    -Chorioamnionitis Ruled Out  -Chorioamnionitis Ruled In and Remained Differential Diagnosis at Discharge  -Other Please Specify  -Not Clinically Significant    Chart Documentation Location:    23 Discharge Summary:  Hospital Course	  Pinky Pike 28yo F  at 37w GA underwent a normal spontaneous vaginal delivery.   Clinical course complicated by chorioamnionitis Given amp/gent/ofirmev.       23 Medication Administratio sheet:  ampicillin  IVPB   2 Gram(s) in sodium chloride 0.9% 100 milliLiter(s), IV Intermittent, once, infuse over 30 Minute(s), Stop After 1 Doses  Indication: chorio    gentamicin   IVPB   320 milliGRAM(s) in sodium chloride 0.9% 150 milliLiter(s), IV Intermittent, once, infuse over 30 Minute(s), Stop After 1 Doses  Indication: chorio      Labs:   - WBC 9.18   - WBC 11.14  / - WBC 8.92      Medication:  2 Grams Ampicillin IVPB  320 Milligrams Gentamycin IVPB  Orifmev 1000 mg, IV    23 Pathology Finding:  Specimen(s) Submitted  1  Placenta    Final Diagnosis  Placenta:    Mature placenta, 494 g with  -   Three-vessel umbilical cord.  -   Infarct with adjacent subchorionic fibrin/blood clot.

## 2023-07-20 NOTE — CHART NOTE - NSCHARTNOTEFT_GEN_A_CORE
Patient was diagnosed with chorioamnionitis during labor.  This condition was ruled in.  It was not present on admission.

## 2023-10-23 ENCOUNTER — NON-APPOINTMENT (OUTPATIENT)
Age: 28
End: 2023-10-23

## 2023-10-23 ENCOUNTER — RESULT REVIEW (OUTPATIENT)
Age: 28
End: 2023-10-23

## 2023-10-23 ENCOUNTER — APPOINTMENT (OUTPATIENT)
Dept: CARDIOLOGY | Facility: CLINIC | Age: 28
End: 2023-10-23
Payer: COMMERCIAL

## 2023-10-23 VITALS — SYSTOLIC BLOOD PRESSURE: 134 MMHG | DIASTOLIC BLOOD PRESSURE: 84 MMHG

## 2023-10-23 VITALS
SYSTOLIC BLOOD PRESSURE: 140 MMHG | DIASTOLIC BLOOD PRESSURE: 80 MMHG | HEART RATE: 75 BPM | WEIGHT: 180 LBS | BODY MASS INDEX: 33.13 KG/M2 | HEIGHT: 62 IN | OXYGEN SATURATION: 100 %

## 2023-10-23 DIAGNOSIS — R79.82 ELEVATED C-REACTIVE PROTEIN (CRP): ICD-10-CM

## 2023-10-23 PROCEDURE — 99214 OFFICE O/P EST MOD 30 MIN: CPT | Mod: 25

## 2023-10-23 PROCEDURE — 93000 ELECTROCARDIOGRAM COMPLETE: CPT

## 2023-11-07 ENCOUNTER — OUTPATIENT (OUTPATIENT)
Dept: OUTPATIENT SERVICES | Facility: HOSPITAL | Age: 28
LOS: 1 days | End: 2023-11-07
Payer: COMMERCIAL

## 2023-11-07 ENCOUNTER — APPOINTMENT (OUTPATIENT)
Dept: ULTRASOUND IMAGING | Facility: CLINIC | Age: 28
End: 2023-11-07
Payer: COMMERCIAL

## 2023-11-07 DIAGNOSIS — R03.0 ELEVATED BLOOD-PRESSURE READING, WITHOUT DIAGNOSIS OF HYPERTENSION: ICD-10-CM

## 2023-11-07 PROCEDURE — 93975 VASCULAR STUDY: CPT | Mod: 26

## 2023-11-07 PROCEDURE — 93975 VASCULAR STUDY: CPT

## 2023-12-04 ENCOUNTER — APPOINTMENT (OUTPATIENT)
Dept: CARDIOLOGY | Facility: CLINIC | Age: 28
End: 2023-12-04
Payer: COMMERCIAL

## 2023-12-04 VITALS
WEIGHT: 177 LBS | OXYGEN SATURATION: 99 % | HEIGHT: 62 IN | DIASTOLIC BLOOD PRESSURE: 70 MMHG | HEART RATE: 81 BPM | BODY MASS INDEX: 32.57 KG/M2 | SYSTOLIC BLOOD PRESSURE: 122 MMHG

## 2023-12-04 PROCEDURE — 99214 OFFICE O/P EST MOD 30 MIN: CPT

## 2023-12-04 RX ORDER — ERGOCALCIFEROL (VITAMIN D2) 1250 MCG
50000 CAPSULE ORAL
Refills: 0 | Status: ACTIVE | COMMUNITY

## 2024-06-17 ENCOUNTER — NON-APPOINTMENT (OUTPATIENT)
Age: 29
End: 2024-06-17

## 2024-06-17 ENCOUNTER — APPOINTMENT (OUTPATIENT)
Dept: CARDIOLOGY | Facility: CLINIC | Age: 29
End: 2024-06-17
Payer: COMMERCIAL

## 2024-06-17 VITALS
SYSTOLIC BLOOD PRESSURE: 128 MMHG | DIASTOLIC BLOOD PRESSURE: 76 MMHG | WEIGHT: 176 LBS | BODY MASS INDEX: 32.39 KG/M2 | HEIGHT: 62 IN | OXYGEN SATURATION: 100 % | HEART RATE: 70 BPM

## 2024-06-17 DIAGNOSIS — E66.9 OBESITY, UNSPECIFIED: ICD-10-CM

## 2024-06-17 DIAGNOSIS — R03.0 ELEVATED BLOOD-PRESSURE READING, W/OUT DIAGNOSIS OF HYPERTENSION: ICD-10-CM

## 2024-06-17 DIAGNOSIS — E78.2 MIXED HYPERLIPIDEMIA: ICD-10-CM

## 2024-06-17 DIAGNOSIS — R01.1 CARDIAC MURMUR, UNSPECIFIED: ICD-10-CM

## 2024-06-17 PROCEDURE — G2211 COMPLEX E/M VISIT ADD ON: CPT | Mod: NC

## 2024-06-17 PROCEDURE — 93000 ELECTROCARDIOGRAM COMPLETE: CPT

## 2024-06-17 PROCEDURE — 99214 OFFICE O/P EST MOD 30 MIN: CPT | Mod: 25

## 2024-06-17 RX ORDER — MAGNESIUM OXIDE/MAG AA CHELATE 300 MG
CAPSULE ORAL DAILY
Refills: 0 | Status: DISCONTINUED | COMMUNITY
End: 2024-06-17

## 2024-06-17 RX ORDER — PNV 24/IRON AA CHEL/FOLIC ACID 30 MG-975
TABLET ORAL DAILY
Refills: 0 | Status: ACTIVE | COMMUNITY

## 2024-06-17 RX ORDER — CALCIUM CARBONATE/VITAMIN D3 600 MG-10
TABLET ORAL DAILY
Refills: 0 | Status: DISCONTINUED | COMMUNITY
End: 2024-06-17

## 2024-06-17 NOTE — PHYSICAL EXAM
Care assumed from EMS. Pt sent m [facility] for Hyperglycemia - , per EMS. [Well Developed] : well developed [Well Nourished] : well nourished [No Acute Distress] : no acute distress [Normal Conjunctiva] : normal conjunctiva [Normal Venous Pressure] : normal venous pressure [No Carotid Bruit] : no carotid bruit [Normal S1, S2] : normal S1, S2 [No Rub] : no rub [No Gallop] : no gallop [Murmur] : murmur [Clear Lung Fields] : clear lung fields [Good Air Entry] : good air entry [No Respiratory Distress] : no respiratory distress  [Soft] : abdomen soft [Non Tender] : non-tender [Normal Bowel Sounds] : normal bowel sounds [Normal Gait] : normal gait [No Edema] : no edema [No Cyanosis] : no cyanosis [No Clubbing] : no clubbing [No Varicosities] : no varicosities [No Rash] : no rash [No Skin Lesions] : no skin lesions [Moves all extremities] : moves all extremities [No Focal Deficits] : no focal deficits [Normal Speech] : normal speech [Alert and Oriented] : alert and oriented [Normal memory] : normal memory [de-identified] : Systolic murmur

## 2024-06-17 NOTE — DISCUSSION/SUMMARY
[FreeTextEntry1] : Patient with  above hx  HTN  post partum preeclampsia : resolved   off of medication  , now  improved compared to prior   visits  .encourage  continue diet restriction , encourage patient to follow low salt diet , home bP monitoring  normal renal ultrasound   Mixed hyperlipidemia :  improved significantly ,still mild elevated   encourage patient to follow life style modification, diet restriction , loose weight ,  repeat blood work  in 6 months   obesity : encourage  patient to loose weight   Elevated HCRP   likely post delivery , family hx of RA , positive DELIO ?  RH one year ago , recent blood work showed negative for  RA DELIO   normal ESR ,repeat CRP is normal range , family hx of  RA  , will  monitor   Cardiac murmur: likely flow murmur , had normal echo   follow up  6 months  [EKG obtained to assist in diagnosis and management of assessed problem(s)] : EKG obtained to assist in diagnosis and management of assessed problem(s)

## 2024-06-17 NOTE — HISTORY OF PRESENT ILLNESS
[FreeTextEntry1] : 28 year old female who had pregnancy induced hypertension with her two pregnancies, had delivered baby on jan 30 th 2023 , came for follow up say no recurrence of chest pain   , patient stopped her antihypertensive medication , her home BP readings being in normal range on diet restriction   Patient had blood work showed     ., HDL 47 , TG 86   HCRP 7.4  decreased 2.3 which is better prior  ,  Patient had echo showed normal ventricular function , echo reviewed   Patient denies any recurrence of chest pain or shortness of breath or dizziness or palpitation  , leg swelling improved after delivery     (patient developed , localized  left parasternal sharp chest pain on feb 1 st , on and off , only in certain position , not related to breathing , not associated with shortness of breath , did have cardiac evaluation , patient negative CT angiogram for PE ,was reproducible , improved over few days  patient went home feb 2 nd , patient came back to hospital  as she noted to have markedly elevated blood pressure , patient medication were adjusted , was sent home on medication , )

## 2024-06-17 NOTE — REASON FOR VISIT
[Symptom and Test Evaluation] : symptom and test evaluation [Arrhythmia/ECG Abnorrmalities] : arrhythmia/ECG abnormalities [Hypertension] : hypertension [Hyperlipidemia] : hyperlipidemia

## 2024-09-09 ENCOUNTER — NON-APPOINTMENT (OUTPATIENT)
Age: 29
End: 2024-09-09

## 2024-09-10 ENCOUNTER — APPOINTMENT (OUTPATIENT)
Dept: OTOLARYNGOLOGY | Facility: CLINIC | Age: 29
End: 2024-09-10
Payer: COMMERCIAL

## 2024-09-10 VITALS — WEIGHT: 171 LBS | HEIGHT: 62 IN | BODY MASS INDEX: 31.47 KG/M2

## 2024-09-10 DIAGNOSIS — H81.10 BENIGN PAROXYSMAL VERTIGO, UNSPECIFIED EAR: ICD-10-CM

## 2024-09-10 DIAGNOSIS — H90.3 SENSORINEURAL HEARING LOSS, BILATERAL: ICD-10-CM

## 2024-09-10 DIAGNOSIS — R42 DIZZINESS AND GIDDINESS: ICD-10-CM

## 2024-09-10 PROCEDURE — 92557 COMPREHENSIVE HEARING TEST: CPT

## 2024-09-10 PROCEDURE — 92567 TYMPANOMETRY: CPT

## 2024-09-10 PROCEDURE — 99204 OFFICE O/P NEW MOD 45 MIN: CPT

## 2024-09-10 NOTE — REVIEW OF SYSTEMS
[Dizziness] : dizziness [Vertigo] : vertigo [Ear Noises] : ear noises [Negative] : Heme/Lymph [de-identified] : nausea while head spin / imbalnce issues/ left ear ringing

## 2024-09-10 NOTE — ASSESSMENT
[FreeTextEntry1] : Patient with approx 5 year history of of intermittent balance issues.   Has had prior eval at ENT - records reviewed - had normal audio and vng - last done 2 years ago.  Sx persist - fairly vague sx.  Normal exam today and normal audio - will repeat vng - will have patient try vestibular exercises - also if vng normal would recommend neuro eval.

## 2024-09-10 NOTE — DATA REVIEWED
[de-identified] :  Type A tymps, AU. Testing via headphones:  - 8000Hz, AU. Recs: 1) F/u w/ MD 2) VNG & ABR as per MD 3) Annual

## 2024-09-10 NOTE — HISTORY OF PRESENT ILLNESS
[de-identified] : c/o feeling dizzy and occ tinnitus.  Notes when bending over and occ with turning in bed.  Feels spinining and off balance.  No change in hearing.   Tinnitus in left ear - intermittent.  Problem going on for about 5 years.  Prior records from [facility] reviewed.  about 2 years ago had normal audio and normal vng. No headache hx.  No blackout or diplopia. No neuro eval.

## 2024-10-11 ENCOUNTER — APPOINTMENT (OUTPATIENT)
Dept: OTOLARYNGOLOGY | Facility: CLINIC | Age: 29
End: 2024-10-11

## 2024-11-05 ENCOUNTER — NON-APPOINTMENT (OUTPATIENT)
Age: 29
End: 2024-11-05

## 2025-05-23 ENCOUNTER — APPOINTMENT (OUTPATIENT)
Dept: UROGYNECOLOGY | Facility: CLINIC | Age: 30
End: 2025-05-23

## 2025-05-23 VITALS
HEART RATE: 85 BPM | SYSTOLIC BLOOD PRESSURE: 116 MMHG | DIASTOLIC BLOOD PRESSURE: 64 MMHG | WEIGHT: 170 LBS | BODY MASS INDEX: 31.28 KG/M2 | HEIGHT: 62 IN | OXYGEN SATURATION: 98 % | TEMPERATURE: 97.6 F

## 2025-05-23 DIAGNOSIS — R39.14 FEELING OF INCOMPLETE BLADDER EMPTYING: ICD-10-CM

## 2025-05-23 DIAGNOSIS — R35.1 NOCTURIA: ICD-10-CM

## 2025-05-23 LAB
BILIRUB UR QL STRIP: NEGATIVE
COLLECTION METHOD: NORMAL
GLUCOSE UR-MCNC: NEGATIVE
HCG UR QL: 0.2 EU/DL
HGB UR QL STRIP.AUTO: NORMAL
KETONES UR-MCNC: NORMAL
LEUKOCYTE ESTERASE UR QL STRIP: NEGATIVE
NITRITE UR QL STRIP: NEGATIVE
PH UR STRIP: 6
PROT UR STRIP-MCNC: NEGATIVE
SP GR UR STRIP: 1.02

## 2025-05-23 PROCEDURE — 99203 OFFICE O/P NEW LOW 30 MIN: CPT | Mod: 25

## 2025-05-23 PROCEDURE — 81002 URINALYSIS NONAUTO W/O SCOPE: CPT

## 2025-05-23 PROCEDURE — 51701 INSERT BLADDER CATHETER: CPT | Mod: 59

## 2025-05-23 PROCEDURE — 99459 PELVIC EXAMINATION: CPT

## 2025-05-27 LAB — BACTERIA UR CULT: NORMAL
